# Patient Record
Sex: FEMALE | Race: WHITE | Employment: STUDENT | URBAN - METROPOLITAN AREA
[De-identification: names, ages, dates, MRNs, and addresses within clinical notes are randomized per-mention and may not be internally consistent; named-entity substitution may affect disease eponyms.]

---
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
## 2023-03-01 ENCOUNTER — OFFICE VISIT (OUTPATIENT)
Dept: OBGYN CLINIC | Facility: CLINIC | Age: 18
End: 2023-03-01
Payer: COMMERCIAL

## 2023-03-01 VITALS
BODY MASS INDEX: 27.58 KG/M2 | DIASTOLIC BLOOD PRESSURE: 58 MMHG | WEIGHT: 140.5 LBS | SYSTOLIC BLOOD PRESSURE: 108 MMHG | HEIGHT: 60 IN

## 2023-03-01 DIAGNOSIS — Z30.41 ENCOUNTER FOR SURVEILLANCE OF CONTRACEPTIVE PILLS: Primary | ICD-10-CM

## 2023-03-01 PROBLEM — Z30.09 CONTRACEPTIVE EDUCATION: Status: ACTIVE | Noted: 2023-03-01

## 2023-03-01 PROBLEM — Z30.011 ENCOUNTER FOR INITIAL PRESCRIPTION OF CONTRACEPTIVE PILLS: Status: ACTIVE | Noted: 2023-03-01

## 2023-03-01 PROCEDURE — 3078F DIAST BP <80 MM HG: CPT | Performed by: OBSTETRICS & GYNECOLOGY

## 2023-03-01 PROCEDURE — 99213 OFFICE O/P EST LOW 20 MIN: CPT | Performed by: OBSTETRICS & GYNECOLOGY

## 2023-03-01 PROCEDURE — 3008F BODY MASS INDEX DOCD: CPT | Performed by: OBSTETRICS & GYNECOLOGY

## 2023-03-01 PROCEDURE — 3074F SYST BP LT 130 MM HG: CPT | Performed by: OBSTETRICS & GYNECOLOGY

## 2023-03-02 DIAGNOSIS — N94.6 DYSMENORRHEA: ICD-10-CM

## 2023-03-02 DIAGNOSIS — N92.6 IRREGULAR MENSTRUAL CYCLE: Primary | ICD-10-CM

## 2023-03-02 RX ORDER — NORETHINDRONE ACETATE AND ETHINYL ESTRADIOL 1MG-20(21)
1 KIT ORAL DAILY
Qty: 84 TABLET | Refills: 3 | Status: SHIPPED | OUTPATIENT
Start: 2023-03-02 | End: 2024-03-01

## 2023-03-02 RX ORDER — NORETHINDRONE ACETATE AND ETHINYL ESTRADIOL 1MG-20(21)
1 KIT ORAL DAILY
Qty: 28 TABLET | Refills: 12 | Status: SHIPPED | OUTPATIENT
Start: 2023-03-02 | End: 2023-03-02

## 2023-06-19 ENCOUNTER — TELEPHONE (OUTPATIENT)
Dept: OBGYN CLINIC | Facility: CLINIC | Age: 18
End: 2023-06-19

## 2023-06-19 DIAGNOSIS — N92.6 IRREGULAR MENSTRUAL CYCLE: ICD-10-CM

## 2023-06-19 DIAGNOSIS — N94.6 DYSMENORRHEA: ICD-10-CM

## 2023-06-19 RX ORDER — NORETHINDRONE ACETATE AND ETHINYL ESTRADIOL 1MG-20(21)
1 KIT ORAL DAILY
Qty: 84 TABLET | Refills: 2 | Status: SHIPPED | OUTPATIENT
Start: 2023-06-19 | End: 2024-02-26

## 2023-06-19 NOTE — TELEPHONE ENCOUNTER
Mom called in requesting a new prescription be sent over to pt's new pharmacy.       BLISOVI FE 1/20 1-20 MG-MCG Oral Tab      [facility] [address] [place] [address], [phone], [phone]

## 2023-09-19 ENCOUNTER — LAB ENCOUNTER (OUTPATIENT)
Dept: LAB | Age: 18
End: 2023-09-19
Attending: INTERNAL MEDICINE
Payer: COMMERCIAL

## 2023-09-19 ENCOUNTER — OFFICE VISIT (OUTPATIENT)
Dept: ALLERGY | Facility: CLINIC | Age: 18
End: 2023-09-19

## 2023-09-19 ENCOUNTER — NURSE ONLY (OUTPATIENT)
Dept: ALLERGY | Facility: CLINIC | Age: 18
End: 2023-09-19

## 2023-09-19 VITALS
BODY MASS INDEX: 27.88 KG/M2 | OXYGEN SATURATION: 99 % | RESPIRATION RATE: 20 BRPM | SYSTOLIC BLOOD PRESSURE: 131 MMHG | TEMPERATURE: 99 F | HEART RATE: 72 BPM | HEIGHT: 60 IN | WEIGHT: 142 LBS | DIASTOLIC BLOOD PRESSURE: 87 MMHG

## 2023-09-19 DIAGNOSIS — L50.1 CHRONIC IDIOPATHIC URTICARIA: Primary | ICD-10-CM

## 2023-09-19 DIAGNOSIS — R79.89 ELEVATED TSH: ICD-10-CM

## 2023-09-19 DIAGNOSIS — Z28.21 COVID-19 VACCINATION DECLINED: ICD-10-CM

## 2023-09-19 DIAGNOSIS — Z91.09 ENVIRONMENTAL ALLERGIES: ICD-10-CM

## 2023-09-19 DIAGNOSIS — L50.8 CHRONIC URTICARIA: Primary | ICD-10-CM

## 2023-09-19 DIAGNOSIS — Z23 FLU VACCINE NEED: ICD-10-CM

## 2023-09-19 LAB — TSI SER-ACNC: 3.59 MIU/ML (ref 0.36–3.74)

## 2023-09-19 PROCEDURE — 3008F BODY MASS INDEX DOCD: CPT | Performed by: ALLERGY & IMMUNOLOGY

## 2023-09-19 PROCEDURE — 3075F SYST BP GE 130 - 139MM HG: CPT | Performed by: ALLERGY & IMMUNOLOGY

## 2023-09-19 PROCEDURE — 95004 PERQ TESTS W/ALRGNC XTRCS: CPT | Performed by: ALLERGY & IMMUNOLOGY

## 2023-09-19 PROCEDURE — 99204 OFFICE O/P NEW MOD 45 MIN: CPT | Performed by: ALLERGY & IMMUNOLOGY

## 2023-09-19 PROCEDURE — 3079F DIAST BP 80-89 MM HG: CPT | Performed by: ALLERGY & IMMUNOLOGY

## 2023-09-19 PROCEDURE — 84443 ASSAY THYROID STIM HORMONE: CPT

## 2023-09-19 PROCEDURE — 36415 COLL VENOUS BLD VENIPUNCTURE: CPT

## 2023-09-19 RX ORDER — LEVOCETIRIZINE DIHYDROCHLORIDE 5 MG/1
5 TABLET, FILM COATED ORAL 2 TIMES DAILY
Qty: 180 TABLET | Refills: 1 | Status: SHIPPED | OUTPATIENT
Start: 2023-09-19

## 2023-09-19 NOTE — PATIENT INSTRUCTIONS
#1Chronic idiopathic urticaria   2+ year history. Started shortly after COVID infection. Denies bruising scarring after rash resolves. No joint pain or joint swelling or fevers. Has tried Benadryl as needed. Tried Zyrtec once before in the past.  No prior Xyzal or Xolair. Prior lip swelling as well suggesting angioedema component    Handouts on chronic urticaria and angioedema provided and reviewed including majority being idiopathic in nature  Reviewed symptomatic care with antihistamines  Recommend a trial of Xyzal, levocetirizine 5 mg once a day up to 4 times per day if needed  Consider Xolair if refractory antihistamines including Xyzal.  Check TSH as previous TSH in May 2021 was slightly elevated. #2 environmental allergies  See above skin testing to common environmental allergens to screen for allergic triggers  Reviewed avoidance measures and potential treatment option of immunotherapy  Xyzal 5 mg once a day as an antihistamine  Flonase or Nasacort 2 sprays per nostril once a day if having prominent nasal congestion or postnasal drip      #3 elevated TSH. Prior TSH in May 2021 was slightly elevated. See above results. Repeat TSH to further evaluate. Reviewed potential association of thyroid disease with times    #4 COVID vaccines advised.   Patient deferred no prior COVID vaccines    #5 recommend flu vaccine this fall

## 2023-09-19 NOTE — PROGRESS NOTES
Star Fenton is a 19year old female. HPI:   Patient presents with:  Hives: Consult. Patient presents with concern of frequent hives with globus sensation in throat. Patient is a 78-year-old female who presents for allergy evaluation with a chief complaint of hives    Today patient reports    Rash:   Duration: since 2021  Started shortly after After covid infection  Symptoms:  hive like rash , lip swelling   Location: all over   Freq: every 2 days   Frequency:  intermittent   Severity: moderate  Denies resp issues   Status:   worse over past year   Tried:  benadryl  prn , zyrtec 1x,   Triggers:  ?? No bruising or scarring after rash resolves   No ed or pred  No epipen   No a/w foods       Preceding fever, infection, bite, sting, antibiotics, NSAID or new medication:    denies   History of physical triggers:   heat     No COVID vaccines noted in EMR    Mom is a PSR at [employer] of asthma, ad, food: denies   Ar? Rn, chetan, benadryl prn   Tsh 5/2021: tsh 4.22            HISTORY:  Past Medical History:   Diagnosis Date    Autism     Ovarian cyst       History reviewed. No pertinent surgical history.    Family History   Problem Relation Age of Onset    Hypertension Mother     Heart Disorder Mother 43         MI - s/p 3 stents     Lipids Mother     Lipids Other         HYPERLIPIDEMIA, FAMILY H/O    Heart Disease Other         CORONARY ARTERY DISEASE, FAMILY H/O    Diabetes Maternal Grandfather     Heart Disorder Maternal Grandfather     Hypertension Maternal Grandfather     Asthma Neg     Cancer Neg       Social History:   Social History     Socioeconomic History    Marital status: Single   Occupational History    Occupation: Student   Tobacco Use    Smoking status: Never     Passive exposure: Past    Smokeless tobacco: Never    Tobacco comments:     Father smokes outside   Vaping Use    Vaping Use: Never used   Substance and Sexual Activity    Alcohol use: Never     Alcohol/week: 0.0 standard drinks of alcohol    Drug use: Never    Sexual activity: Never   Other Topics Concern    Second-hand smoke exposure No    Alcohol/drug concerns No    Violence concerns No    Blood Transfusions No   Social History Narrative    Live with patients, aunt, sibling and dogs    No h/o abuse        Medications (Active prior to today's visit):  Current Outpatient Medications   Medication Sig Dispense Refill    levocetirizine 5 MG Oral Tab Take 1 tablet (5 mg total) by mouth in the morning and 1 tablet (5 mg total) before bedtime. 180 tablet 1    BLISOVI FE 1/20 1-20 MG-MCG Oral Tab Take 1 tablet by mouth daily. 84 tablet 2    Norethin Ace-Eth Estrad-FE (BLISOVI FE 1/20) 1-20 MG-MCG Oral Tab Take 1 tablet by mouth daily.  84 tablet 3       Allergies:    Junel 1-20 [Necon]      PALPITATIONS    Comment:Heart racing, mood changes  Z-Good [Azithromycin]    REACTIVE AIRWAY DISEASE      ROS:     Allergic/Immuno:  See HPI  Cardiovascular:  Negative for irregular heartbeat/palpitations, chest pain, edema  Constitutional:  Negative night sweats,weight loss, irritability and lethargy  Endocrine:  Negative for cold intolerance, polydipsia and polyphagia  ENMT:  Negative for ear drainage, hearing loss see hpi   Eyes:  Negative for eye discharge and vision loss  Gastrointestinal:  Negative for abdominal pain, diarrhea and vomiting  Genitourinary:  Negative for dysuria and hematuria  Hema/Lymph:  Negative for easy bleeding and easy bruising  Integumentary:   see hpi   Musculoskeletal:  Negative for joint symptoms  Neurological:  Negative for dizziness, seizures  Psychiatric:  Negative for inappropriate interaction and psychiatric symptoms  Respiratory:  Negative for cough, dyspnea and wheezing      PHYSICAL EXAM:   Constitutional: responsive, no acute distress noted  Head/Face: NC/Atraumatic  Eyes/Vision: conjunctiva and lids are normal extraocular motion is intact   Ears/Audiometry: tympanic membranes are normal bilaterally hearing is grossly intact  Nose/Mouth/Throat: nose and throat are clear mucous membranes are moist   Neck/Thyroid: neck is supple without adenopathy  Lymphatic: no abnormal cervical, supraclavicular or axillary adenopathy is noted  Respiratory: normal to inspection lungs are clear to auscultation bilaterally normal respiratory effort   Cardiovascular: regular rate and rhythm no murmurs, gallups, or rubs  Abdomen: soft non-tender non-distended  Skin/Hair: no unusual rashes present  Extremities: no edema, cyanosis, or clubbing  Neurological:Oriented to time, place, person & situation       ASSESSMENT/PLAN:   Assessment   Chronic idiopathic urticaria  (primary encounter diagnosis)  Covid-19 vaccination declined  Flu vaccine need  Elevated tsh  Environmental allergies    Skin testing today to common indoor and outdoor environmental allergies was  Negative on scratch testing  Positive histamine control  Patient deferred intradermal testing    #1 Chronic idiopathic urticaria   2+ year history. Started shortly after COVID infection. Denies bruising scarring after rash resolves. No joint pain or joint swelling or fevers. Has tried Benadryl as needed. Tried Zyrtec once before in the past.  No prior Xyzal or Xolair. Prior lip swelling as well suggesting angioedema component    Handouts on chronic urticaria and angioedema provided and reviewed including majority being idiopathic in nature  Reviewed symptomatic care with antihistamines  Recommend a trial of Xyzal, levocetirizine 5 mg once a day up to 4 times per day if needed  Consider Xolair if refractory antihistamines including Xyzal.  Check TSH as previous TSH in May 2021 was slightly elevated.     #2 environmental allergies  See above skin testing to common environmental allergens to screen for allergic triggers  Reviewed avoidance measures and potential treatment option of immunotherapy  Xyzal 5 mg once a day as an antihistamine  Flonase or Nasacort 2 sprays per nostril once a day if having prominent nasal congestion or postnasal drip      #3 elevated TSH. Prior TSH in May 2021 was slightly elevated. See above results. Repeat TSH to further evaluate. Reviewed potential association of thyroid disease with times    #4 COVID vaccines advised. Patient deferred no prior COVID vaccines    #5 recommend flu vaccine this fall         Orders This Visit:  Orders Placed This Encounter      TSH W Reflex To Free T4      Meds This Visit:  Requested Prescriptions     Signed Prescriptions Disp Refills    levocetirizine 5 MG Oral Tab 180 tablet 1     Sig: Take 1 tablet (5 mg total) by mouth in the morning and 1 tablet (5 mg total) before bedtime. Imaging & Referrals:  None     9/19/2023  Zechariah Claudio MD      If medication samples were provided today, they were provided solely for patient education and training related to self administration of these medications. Teaching, instruction and sample was provided to the patient by myself. Teaching included  a review of potential adverse side effects as well as potential efficacy. Patient's questions were answered in regards to medication administration and dosing and potential side effects.  Teaching was provided via the teach back method

## 2023-09-20 ENCOUNTER — TELEPHONE (OUTPATIENT)
Dept: ALLERGY | Facility: CLINIC | Age: 18
End: 2023-09-20

## 2023-09-20 NOTE — TELEPHONE ENCOUNTER
Pt returned call. She confirmed her name and . RN went over results. Pt verbalized understanding and denies any further questions or concerns.

## 2023-09-20 NOTE — TELEPHONE ENCOUNTER
RN called pt to go over results. Mother answered phone, she reports that she is not with pt. RN requested that pt contact our office to go over results. Mother verbalizes understanding and reports that she will notify pt. Provided call back number.      ----- Message from Darío Veloz MD sent at 9/20/2023  7:35 AM CDT -----    Please contact patient with normal TSH level 3.59

## 2024-01-19 ENCOUNTER — LAB ENCOUNTER (OUTPATIENT)
Dept: LAB | Facility: REFERENCE LAB | Age: 19
End: 2024-01-19
Attending: OBSTETRICS & GYNECOLOGY
Payer: COMMERCIAL

## 2024-01-19 ENCOUNTER — OFFICE VISIT (OUTPATIENT)
Dept: OBGYN CLINIC | Facility: CLINIC | Age: 19
End: 2024-01-19
Payer: COMMERCIAL

## 2024-01-19 VITALS
WEIGHT: 150 LBS | BODY MASS INDEX: 29.45 KG/M2 | DIASTOLIC BLOOD PRESSURE: 62 MMHG | HEIGHT: 60 IN | SYSTOLIC BLOOD PRESSURE: 126 MMHG

## 2024-01-19 DIAGNOSIS — N92.6 IRREGULAR MENSTRUAL CYCLE: ICD-10-CM

## 2024-01-19 DIAGNOSIS — N92.6 IRREGULAR MENSTRUAL CYCLE: Primary | ICD-10-CM

## 2024-01-19 LAB
CONTROL LINE PRESENT WITH A CLEAR BACKGROUND (YES/NO): YES YES/NO
FSH SERPL-ACNC: 6.5 MIU/ML
KIT EXPIRATION DATE: NORMAL DATE
KIT LOT #: NORMAL NUMERIC
LH SERPL-ACNC: 28.8 MIU/ML
PREGNANCY TEST, URINE: NEGATIVE
PROLACTIN SERPL-MCNC: 12.5 NG/ML
T4 FREE SERPL-MCNC: 1 NG/DL (ref 0.8–1.7)
TSI SER-ACNC: 7.38 MIU/ML (ref 0.55–4.78)

## 2024-01-19 PROCEDURE — 84146 ASSAY OF PROLACTIN: CPT | Performed by: OBSTETRICS & GYNECOLOGY

## 2024-01-19 PROCEDURE — 83002 ASSAY OF GONADOTROPIN (LH): CPT

## 2024-01-19 PROCEDURE — 3074F SYST BP LT 130 MM HG: CPT | Performed by: OBSTETRICS & GYNECOLOGY

## 2024-01-19 PROCEDURE — 87491 CHLMYD TRACH DNA AMP PROBE: CPT | Performed by: OBSTETRICS & GYNECOLOGY

## 2024-01-19 PROCEDURE — 83001 ASSAY OF GONADOTROPIN (FSH): CPT

## 2024-01-19 PROCEDURE — 36415 COLL VENOUS BLD VENIPUNCTURE: CPT

## 2024-01-19 PROCEDURE — 3008F BODY MASS INDEX DOCD: CPT | Performed by: OBSTETRICS & GYNECOLOGY

## 2024-01-19 PROCEDURE — 84443 ASSAY THYROID STIM HORMONE: CPT

## 2024-01-19 PROCEDURE — 3078F DIAST BP <80 MM HG: CPT | Performed by: OBSTETRICS & GYNECOLOGY

## 2024-01-19 PROCEDURE — 84439 ASSAY OF FREE THYROXINE: CPT

## 2024-01-19 PROCEDURE — 99213 OFFICE O/P EST LOW 20 MIN: CPT | Performed by: OBSTETRICS & GYNECOLOGY

## 2024-01-19 PROCEDURE — 87591 N.GONORRHOEAE DNA AMP PROB: CPT | Performed by: OBSTETRICS & GYNECOLOGY

## 2024-01-19 PROCEDURE — 81025 URINE PREGNANCY TEST: CPT | Performed by: OBSTETRICS & GYNECOLOGY

## 2024-01-19 NOTE — PROGRESS NOTES
GYN H&P     2024  9:36 AM    CC: Patient is here for missed period. UCG negative here today. Patient does not want to be pregnant.     HPI: Patient is a 18 year old  for missed period. Her period is 10 days late. Home UCG was negative.     She is sexually active, using condoms. She previous used OCP's and \"heart rate went too high\" and had to stop OCP's.     Menses: usually 1 x  per month, heavy, painful   Pelvic Pain: None  Vaginal discharge:None    Review of Systems:    Pertinent positive and negatives reviewed in HPI    CONSTITUTIONAL:  No weight loss, no fever, chills, no weakness, no fatigue. + 10 lbs wt gain in the past week,   SKIN:  No rash, no  itching. No new facial or body hair, new new acne  GASTROINTESTINAL:  No anorexia, + nausea, no vomiting, no diarrhea, no abdominal pain, no blood in stool or blackened stools, no constipation, no bloating  GENITOURINARY:  No burning on urination. No urinary frequency.  No blood in urine. No urinary incontinence  MUSCULOSKELETAL:  No muscle pain, no back pain, no joint pain, no stiffness.  HEMATOLOGIC:  No history of anemia, no excessive bleeding, no excessive bruising.  LYMPHATICS:  No enlarged nodes. No history of splenectomy.  PSYCHIATRIC:  No history of depression, no history of anxiety.  ENDOCRINOLOGIC:  No reports of sweating, + cold intolerance, no heat intolerance, no excessive urination, no excessive thirst  ALLERGIES:  No history of asthma, no hives, no eczema, no rhinitis.      Depression Screening (PHQ-2/PHQ-9): Over the LAST 2 WEEKS   Little interest or pleasure in doing things (over the last two weeks)?: Not at all    Feeling down, depressed, or hopeless (over the last two weeks)?: Not at all    PHQ-2 SCORE: 0          Patient's last menstrual period was 2023 (exact date).    OB History    Para Term  AB Living   0 0 0 0 0 0   SAB IAB Ectopic Multiple Live Births   0 0 0 0 0       GYN hx:         STI HX: none  LPS:  never  Hx of abnormal paps no    CONTRACEPTION: condoms  SEXUALLY ACTIVE: yes  CONDOM USE: yes  HPV VACCINE no  LAST MAMMOGRAM: no      Past Medical History:   Diagnosis Date    Autism     Environmental allergies     Ovarian cyst      History reviewed. No pertinent surgical history.  Allergies   Allergen Reactions    Junel 1-20 [Necon] PALPITATIONS     Heart racing, mood changes      Z-Good [Azithromycin] REACTIVE AIRWAY DISEASE     Family History   Problem Relation Age of Onset    Hypertension Mother     Heart Disorder Mother 42         MI - s/p 3 stents     Lipids Mother     Diabetes Maternal Grandfather     Heart Disorder Maternal Grandfather     Hypertension Maternal Grandfather     Lipids Other         HYPERLIPIDEMIA, FAMILY H/O    Heart Disease Other         CORONARY ARTERY DISEASE, FAMILY H/O    Asthma Neg     Cancer Neg      Social History     Socioeconomic History    Marital status: Single   Occupational History    Occupation: Student     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   Tobacco Use    Smoking status: Never     Passive exposure: Past    Smokeless tobacco: Never    Tobacco comments:     Father smokes outside   Vaping Use    Vaping Use: Never used   Substance and Sexual Activity    Alcohol use: Never     Alcohol/week: 0.0 standard drinks of alcohol    Drug use: Never    Sexual activity: Yes     Partners: Male     Birth control/protection: Condom     Social History     Social History Narrative    Live with patients, aunt, sibling, grandmother and dogs    No h/o abuse       Medications reviewed. See active list.     /62   Ht 60\"   Wt 150 lb (68 kg)   LMP 12/08/2023 (Exact Date)   BMI 29.29 kg/m²       Exam:   GENERAL: well developed, well nourished, in no apparent distress        A/P: Patient is 18 year old female     1. Irregular menstrual cycle  - Urine Preg Test [15449]  - LH (Luteinizing Hormone); Future  - FSH; Future  - TSH W Reflex To Free T4; Future  - Prolactin    Check labs.   I dw her  birth control options and written info given re: Mirena IUD. Patient is considering.     Rahel Perez MD

## 2024-01-20 PROBLEM — R79.89 ELEVATED TSH: Status: ACTIVE | Noted: 2024-01-20

## 2024-01-22 LAB
C TRACH DNA SPEC QL NAA+PROBE: NEGATIVE
N GONORRHOEA DNA SPEC QL NAA+PROBE: NEGATIVE

## 2024-01-24 ENCOUNTER — OFFICE VISIT (OUTPATIENT)
Dept: INTERNAL MEDICINE CLINIC | Facility: CLINIC | Age: 19
End: 2024-01-24
Payer: COMMERCIAL

## 2024-01-24 ENCOUNTER — LAB ENCOUNTER (OUTPATIENT)
Dept: LAB | Age: 19
End: 2024-01-24
Payer: COMMERCIAL

## 2024-01-24 VITALS
SYSTOLIC BLOOD PRESSURE: 118 MMHG | HEART RATE: 79 BPM | HEIGHT: 61 IN | BODY MASS INDEX: 28.7 KG/M2 | WEIGHT: 152 LBS | DIASTOLIC BLOOD PRESSURE: 76 MMHG

## 2024-01-24 DIAGNOSIS — E03.8 SUBCLINICAL HYPOTHYROIDISM: ICD-10-CM

## 2024-01-24 DIAGNOSIS — E03.8 SUBCLINICAL HYPOTHYROIDISM: Primary | ICD-10-CM

## 2024-01-24 LAB
T4 FREE SERPL-MCNC: 0.9 NG/DL (ref 0.8–1.7)
THYROPEROXIDASE AB SERPL-ACNC: <28 U/ML (ref ?–60)
TSI SER-ACNC: 5.33 MIU/ML (ref 0.55–4.78)

## 2024-01-24 PROCEDURE — 84443 ASSAY THYROID STIM HORMONE: CPT

## 2024-01-24 PROCEDURE — 86376 MICROSOMAL ANTIBODY EACH: CPT

## 2024-01-24 PROCEDURE — 84439 ASSAY OF FREE THYROXINE: CPT

## 2024-01-24 PROCEDURE — 3008F BODY MASS INDEX DOCD: CPT

## 2024-01-24 PROCEDURE — 3074F SYST BP LT 130 MM HG: CPT

## 2024-01-24 PROCEDURE — 36415 COLL VENOUS BLD VENIPUNCTURE: CPT

## 2024-01-24 PROCEDURE — 3078F DIAST BP <80 MM HG: CPT

## 2024-01-24 PROCEDURE — 99213 OFFICE O/P EST LOW 20 MIN: CPT

## 2024-01-24 NOTE — PROGRESS NOTES
Subjective:   Karen Oglesby is a 18 year old female who presents for Follow - Up (Discuss lab results - elevated thyroid )     Was at her OBGYN recently for a missed period, is now 16 days late and has still not gotten her period  She was also experiencing nausea with certain foods, mood swings, excessive tiredness, and cold intolerance, and also gained 10lbs in about a week, so testing was done including thyroid testing which found a mildly elevated TSH of 7.382 and free T4 of 1.0  Pregnancy test was negative at OB office, LH, FSH and prolactin were normal   Usually has regular periods monthly but are very heavy and painful. States her OB had told her she was in the early stages of endometriosis. She also has a sister with PCOS. She has had multiple pelvic US with no evidence of PCOS. She does note excess hair growth in the axilla, abdomen or on the knees but not on the face.  When she had COVID in 2021 she had irregular periods and would go 4-6 months without period, but periods have since normalized.   Denies recent illness but notes she has been under more stress recently due to school and deaths in the family   Denies recent excessive exercise     Reports history of anxiety related to autism, states she is always anxious, has tried therapy in the past without success, has never tried medication  Sometimes feels her heart racing with anxiety, also occurs when she takes medication such as tylenol and when she took OCPs   Last night felt the heart racing and improved when she took deep breaths and relaxed  No dizziness or chest pain with these episodes    History/Other:    Chief Complaint Reviewed and Verified  Nursing Notes Reviewed and   Verified  Tobacco Reviewed  Allergies Reviewed  Medications Reviewed    Problem List Reviewed  Medical History Reviewed  Surgical History   Reviewed  OB Status Reviewed  Family History Reviewed         Tobacco:  She has never smoked tobacco.    No current  outpatient medications on file.     Review of Systems:  Review of Systems   Constitutional:  Positive for fatigue and unexpected weight change.   Respiratory: Negative.     Cardiovascular:  Positive for palpitations. Negative for chest pain.   Gastrointestinal:  Positive for nausea.   Endocrine: Positive for cold intolerance.   Genitourinary:  Positive for menstrual problem.   Skin: Negative.    Neurological: Negative.  Negative for dizziness.   Psychiatric/Behavioral:  The patient is nervous/anxious.      Objective:   /76 (BP Location: Right arm, Patient Position: Sitting, Cuff Size: adult)   Pulse 79   Ht 5' 1\" (1.549 m)   Wt 152 lb (68.9 kg)   LMP 12/08/2023 (Exact Date)   BMI 28.72 kg/m²  Estimated body mass index is 28.72 kg/m² as calculated from the following:    Height as of this encounter: 5' 1\" (1.549 m).    Weight as of this encounter: 152 lb (68.9 kg).  Physical Exam  Vitals reviewed.   Constitutional:       General: She is not in acute distress.     Appearance: Normal appearance. She is well-developed.   Neck:      Thyroid: No thyroid mass or thyromegaly.   Cardiovascular:      Rate and Rhythm: Normal rate and regular rhythm.      Heart sounds: Normal heart sounds.   Pulmonary:      Effort: Pulmonary effort is normal.      Breath sounds: Normal breath sounds.   Lymphadenopathy:      Cervical: No cervical adenopathy.   Skin:     General: Skin is warm and dry.   Neurological:      Mental Status: She is alert and oriented to person, place, and time.       Assessment & Plan:   1. Subclinical hypothyroidism (Primary)  -     Thyroid Peroxidase (TPO) AB; Future; Expected date: 01/24/2024  -     TSH and Free T4; Future; Expected date: 02/24/2024  Discussed not currently in treatment range as TSH mildly elevated and Free T4 normal. Will check TPO AB and recheck thyroid function in about a month  Continue to monitor menstrual cycles - discussed other possible causes of abnormality    JUAN JOSE Carpenter,  1/24/2024, 10:29 AM

## 2024-05-25 ENCOUNTER — HOSPITAL ENCOUNTER (OUTPATIENT)
Age: 19
Discharge: HOME | End: 2024-05-25

## 2024-05-25 ENCOUNTER — APPOINTMENT (OUTPATIENT)
Dept: GENERAL RADIOLOGY | Age: 19
End: 2024-05-25
Attending: NURSE PRACTITIONER

## 2024-05-25 VITALS
DIASTOLIC BLOOD PRESSURE: 83 MMHG | TEMPERATURE: 99 F | SYSTOLIC BLOOD PRESSURE: 134 MMHG | RESPIRATION RATE: 20 BRPM | HEART RATE: 68 BPM | OXYGEN SATURATION: 100 %

## 2024-05-25 DIAGNOSIS — S99.912A LEFT ANKLE INJURY, INITIAL ENCOUNTER: ICD-10-CM

## 2024-05-25 DIAGNOSIS — S99.922A FOOT INJURY, LEFT, INITIAL ENCOUNTER: Primary | ICD-10-CM

## 2024-05-25 PROCEDURE — 99213 OFFICE O/P EST LOW 20 MIN: CPT | Performed by: NURSE PRACTITIONER

## 2024-05-25 PROCEDURE — A6448 LT COMPRES BAND <3"/YD: HCPCS | Performed by: NURSE PRACTITIONER

## 2024-05-25 PROCEDURE — 73610 X-RAY EXAM OF ANKLE: CPT | Performed by: NURSE PRACTITIONER

## 2024-05-25 PROCEDURE — 73630 X-RAY EXAM OF FOOT: CPT | Performed by: NURSE PRACTITIONER

## 2024-05-25 NOTE — ED PROVIDER NOTES
Patient Seen in: [facility]      History     Chief Complaint   Patient presents with    Leg or Foot Injury     Stated Complaint: Right ankle injury    Subjective:   18 y/o female with medical conditions as noted below presents with c/o right ankle and foot pain and swelling onset yesterday after twisting her foot when she stepped over a box. Iced area and took tylenol. Has been walking with a limp.             Objective:   Past Medical History:    Autism (HCC)    Environmental allergies    Ovarian cyst              History reviewed. No pertinent surgical history.             Social History     Socioeconomic History    Marital status: Single   Occupational History    Occupation: Student     Comment: studying education @ [employer].   Tobacco Use    Smoking status: Never     Passive exposure: Past    Smokeless tobacco: Never    Tobacco comments:     Father smokes outside   Vaping Use    Vaping status: Never Used   Substance and Sexual Activity    Alcohol use: Never     Alcohol/week: 0.0 standard drinks of alcohol    Drug use: Never    Sexual activity: Yes     Partners: Male     Birth control/protection: Condom   Other Topics Concern    Second-hand smoke exposure No    Alcohol/drug concerns No    Violence concerns No    Blood Transfusions No   Social History Narrative    Live with patients, aunt, sibling, grandmother and dogs    No h/o abuse              Review of Systems   Musculoskeletal:  Positive for joint swelling.   Neurological:  Negative for numbness.   All other systems reviewed and are negative.      Positive for stated complaint: Right ankle injury  Other systems are as noted in HPI.  Constitutional and vital signs reviewed.      All other systems reviewed and negative except as noted above.    Physical Exam     ED Triage Vitals [05/25/24 1506]   /83   Pulse 68   Resp 20   Temp 98.6 °F (37 °C)   Temp src Temporal   SpO2 100 %   O2 Device None (Room air)       Current Vitals:   Vital  Signs  BP: 134/83  Pulse: 68  Resp: 20  Temp: 98.6 °F (37 °C)  Temp src: Temporal    Oxygen Therapy  SpO2: 100 %  O2 Device: None (Room air)            Physical Exam  Vitals and nursing note reviewed.   Constitutional:       General: She is not in acute distress.     Appearance: Normal appearance.   Cardiovascular:      Rate and Rhythm: Normal rate and regular rhythm.   Pulmonary:      Effort: Pulmonary effort is normal.      Breath sounds: Normal breath sounds.   Musculoskeletal:         General: Swelling and tenderness present.      Right ankle: No swelling. Tenderness present over the lateral malleolus and medial malleolus. Normal range of motion.      Right Achilles Tendon: Normal.      Right foot: Swelling and tenderness present. No deformity.      Comments: Right ankle/foot: no swelling to ankle. Swelling to top of foot. Tenderness on palpation bilateral malleolus and anterior dorsal surface of foot.  No deformity, ecchymosis. Valenzuela's test negative. 2+pedal/postibial pulses. FROM to ankle and toes.      Skin:     General: Skin is warm and dry.      Capillary Refill: Capillary refill takes less than 2 seconds.   Neurological:      Mental Status: She is alert and oriented to person, place, and time.   Psychiatric:         Behavior: Behavior is cooperative.               ED Course   Labs Reviewed - No data to display  XR FOOT, COMPLETE (MIN 3 VIEWS), RIGHT (CPT=73630)    Result Date: 5/25/2024  CONCLUSION:   No radiographically visible acute osseous injury of the right foot.    elm-remote  Dictated by (CST): Perez Gallagher MD on 5/25/2024 at 3:22 PM     Finalized by (CST): Perez Gallagher MD on 5/25/2024 at 3:23 PM          XR ANKLE (MIN 3 VIEWS), RIGHT (CPT=73610)    Result Date: 5/25/2024  CONCLUSION:   No radiographically visible acute osseous injury of the right ankle.    elm-remote  Dictated by (CST): Perez Gallagher MD on 5/25/2024 at 3:22 PM     Finalized by (CST): Perez Gallagher MD on 5/25/2024 at  3:22 PM                          MDM                                         Medical Decision Making  Patient is well-appearing. In NAD  I discussed differentials with patient including but not limited to sprain vs fracture  Xray independently reviewed and discussed with patient, negative fractures  Ace wrap applied by RN. Declined crutches  otc meds prn  Fu with PCP/Ortho. Return/ ED precautions discussed      Problems Addressed:  Foot injury, left, initial encounter: acute illness or injury  Left ankle injury, initial encounter: acute illness or injury    Amount and/or Complexity of Data Reviewed  Radiology: ordered. Decision-making details documented in ED Course.    Risk  OTC drugs.        Disposition and Plan     Clinical Impression:  1. Foot injury, left, initial encounter    2. Left ankle injury, initial encounter         Disposition:  Discharge  5/25/2024  3:31 pm    Follow-up:  Cheryl Schaefer MD  [address] [facility] [id number] [phone]          Atiya Becker DPM  [address] [facility] [address]  [facility] [id number] [phone]                Medications Prescribed:  There are no discharge medications for this patient.

## 2024-05-25 NOTE — ED INITIAL ASSESSMENT (HPI)
Pt here with complaints of right foot injury, pt states she tripped over a box at work , pt right foot swollen and states she has discomfort when ambulating

## 2024-07-12 ENCOUNTER — TELEPHONE (OUTPATIENT)
Dept: OBGYN CLINIC | Facility: CLINIC | Age: 19
End: 2024-07-12

## 2024-07-12 NOTE — TELEPHONE ENCOUNTER
Pts LMP of 5/1/2024 making her 10w2d. Patient cycles are irregular states only getting them every other month. 3/7/2024 was pregnancy States +UPT. Pt informed of both male and female providers and the need to rotate PN appt with all since OB on-call will be the one that delivers her. Pt accepts rotation and wishes to proceed with establishing care. Patient scheduled to establish care due to irregular cycles. Patient to see Karen Sanchez 7/17.     Stated HT: 5 ft 1 in  Stated Pre-pregnancy WT: 152 lb

## 2024-07-17 ENCOUNTER — LAB ENCOUNTER (OUTPATIENT)
Dept: LAB | Age: 19
End: 2024-07-17
Attending: NURSE PRACTITIONER
Payer: COMMERCIAL

## 2024-07-17 ENCOUNTER — TELEPHONE (OUTPATIENT)
Dept: OBGYN CLINIC | Facility: CLINIC | Age: 19
End: 2024-07-17

## 2024-07-17 ENCOUNTER — OFFICE VISIT (OUTPATIENT)
Dept: OBGYN CLINIC | Facility: CLINIC | Age: 19
End: 2024-07-17
Payer: COMMERCIAL

## 2024-07-17 VITALS
BODY MASS INDEX: 30 KG/M2 | DIASTOLIC BLOOD PRESSURE: 74 MMHG | SYSTOLIC BLOOD PRESSURE: 118 MMHG | HEART RATE: 71 BPM | WEIGHT: 159 LBS

## 2024-07-17 DIAGNOSIS — N92.6 MISSED MENSES: ICD-10-CM

## 2024-07-17 DIAGNOSIS — E03.8 SUBCLINICAL HYPOTHYROIDISM: ICD-10-CM

## 2024-07-17 DIAGNOSIS — Z32.02 PREGNANCY EXAMINATION OR TEST, NEGATIVE RESULT: Primary | ICD-10-CM

## 2024-07-17 LAB
B-HCG SERPL-ACNC: <2.6 MIU/ML
CONTROL LINE PRESENT WITH A CLEAR BACKGROUND (YES/NO): YES YES/NO
KIT LOT #: NORMAL NUMERIC
PREGNANCY TEST, URINE: NEGATIVE
T4 FREE SERPL-MCNC: 1 NG/DL (ref 0.8–1.7)
TSI SER-ACNC: 5.86 MIU/ML (ref 0.55–4.78)

## 2024-07-17 PROCEDURE — 36415 COLL VENOUS BLD VENIPUNCTURE: CPT

## 2024-07-17 PROCEDURE — 84702 CHORIONIC GONADOTROPIN TEST: CPT

## 2024-07-17 PROCEDURE — 84443 ASSAY THYROID STIM HORMONE: CPT

## 2024-07-17 PROCEDURE — 99212 OFFICE O/P EST SF 10 MIN: CPT | Performed by: NURSE PRACTITIONER

## 2024-07-17 PROCEDURE — 84439 ASSAY OF FREE THYROXINE: CPT

## 2024-07-17 PROCEDURE — 81025 URINE PREGNANCY TEST: CPT | Performed by: NURSE PRACTITIONER

## 2024-07-17 NOTE — TELEPHONE ENCOUNTER
Patient informed of results and recommendations to check home pregnancy test in one week of no period.

## 2024-07-17 NOTE — TELEPHONE ENCOUNTER
Please inform patient her HCG today was negative on urine and on lab test. She got a +home pregnancy test on 7/10& 7/12.  If not period by 1 week from now, repeat home pregnancy test.

## 2024-07-17 NOTE — PROGRESS NOTES
[facility]   Obstetrics and Gynecology    Karen Oglesby is a 19 year old female  Patient's last menstrual period was 2024 (exact date).   Chief Complaint   Patient presents with    Gyn Problem     POST UPT   New patient. Sure last menstrual period. Prior period to May had a period in March. Periods every other month.    Hx of allergy to birth control - stopped a year ago. Had a reaction to generic - she had tachycardia with generic     Positive home urine pregnancy test on 7/10- faint, then darker on .  Symptoms of pregnancy - queezy, tired, changes in smell. Some low pevic cramping. No bleeding.    She is taking a prenatal vitamin.  Today in office urine pregnancy test negative.      Elevated TSH in January but normal FT4.     Not trying for pregnancy, using condoms.    Pap:never  Contraception: none    OBSTETRICS HISTORY:  OB History    Para Term  AB Living   1 0 0 0 0 0   SAB IAB Ectopic Multiple Live Births   0 0 0 0 0       GYNE HISTORY:  Menarche: 8y.o. (2024  8:37 AM)  Use of Birth Control (if yes, specify type): None (2024  8:37 AM)  Hx Prior Abnormal Pap: No (2024  8:37 AM)  Pap Result Notes: Never had pap (2024  8:37 AM)      History   Sexual Activity    Sexual activity: Yes    Partners: Male    Birth control/ protection: Condom       MEDICAL HISTORY:  Past Medical History:    Autism (HCC)    Environmental allergies    Ovarian cyst       SOCIAL HISTORY:  Social History     Socioeconomic History    Marital status: Single     Spouse name: Not on file    Number of children: Not on file    Years of education: Not on file    Highest education level: Not on file   Occupational History    Occupation: Student     Comment: studying education @ [employer].   Tobacco Use    Smoking status: Never     Passive exposure: Past    Smokeless tobacco: Never    Tobacco comments:     Father smokes outside   Vaping Use    Vaping status: Never Used   Substance and Sexual  Activity    Alcohol use: Never     Alcohol/week: 0.0 standard drinks of alcohol    Drug use: Never    Sexual activity: Yes     Partners: Male     Birth control/protection: Condom   Other Topics Concern    Second-hand smoke exposure No    Alcohol/drug concerns No    Violence concerns No     Service Not Asked    Blood Transfusions No    Caffeine Concern Not Asked    Occupational Exposure Not Asked    Hobby Hazards Not Asked    Sleep Concern Not Asked    Stress Concern Not Asked    Weight Concern Not Asked    Special Diet Not Asked    Back Care Not Asked    Exercise Not Asked    Bike Helmet Not Asked    Seat Belt Not Asked    Self-Exams Not Asked   Social History Narrative    Live with patients, aunt, sibling, grandmother and dogs    No h/o abuse     Social Determinants of Health     Financial Resource Strain: Not on file   Food Insecurity: Not on file   Transportation Needs: Not on file   Stress: Not on file   Housing Stability: Not on file       MEDICATIONS:  No current outpatient medications on file.    ALLERGIES:    Allergies   Allergen Reactions    Junel 1-20 [Necon] PALPITATIONS     Heart racing, mood changes      Z-Good [Azithromycin] REACTIVE AIRWAY DISEASE         Review of Systems:  Constitutional:  Denies fatigue, night sweats, hot flashes  Cardiovascular:  denies chest pain or palpitations  Respiratory:  denies shortness of breath  Gastrointestinal:  denies heartburn, abdominal pain, diarrhea or constipation  Genitourinary:  denies dysuria, incontinence, abnormal vaginal discharge, vaginal itching   Musculoskeletal:  denies back pain.  Skin/Breast:  Denies any breast pain, lumps, or discharge.   Neurological:  denies headaches, extremity weakness or numbness.  Psychiatric: denies depression or anxiety.  Endocrine:   denies excessive thirst or urination.  Heme/Lymph:  denies history of anemia, easy bruising or bleeding.      PHYSICAL EXAM:     Vitals:    07/17/24 0837   BP: 118/74   Pulse: 71    Weight: 159 lb (72.1 kg)     Body mass index is 30.04 kg/m².       Constitutional: well developed, well nourished    Psychiatric:  Oriented to time, place, person and situation. Appropriate mood and affect    Assessment & Plan:  Karen was seen today for gyn problem.    Diagnoses and all orders for this visit:    Pregnancy examination or test, negative result  -     Urine Pregnancy Test    Missed menses  -     HCG, Beta Subunit (Quant Pregnancy Test); Future      Will do lab HCG quant today  Will follow up based on results.  Hx of irregular periods  Repeat TSH also. If HCG + and TSH still elevated would recommend treating.    JUAN JOSE Hemphill    This note was prepared using Dragon Medical voice recognition dictation software. As a result errors may occur. When identified these errors have been corrected. While every attempt is made to correct errors during dictation discrepancies may still exist.

## 2024-09-30 ENCOUNTER — APPOINTMENT (OUTPATIENT)
Dept: ULTRASOUND IMAGING | Facility: HOSPITAL | Age: 19
End: 2024-09-30
Attending: EMERGENCY MEDICINE
Payer: COMMERCIAL

## 2024-09-30 ENCOUNTER — HOSPITAL ENCOUNTER (EMERGENCY)
Facility: HOSPITAL | Age: 19
Discharge: HOME | End: 2024-09-30
Attending: EMERGENCY MEDICINE
Payer: COMMERCIAL

## 2024-09-30 VITALS
HEART RATE: 82 BPM | TEMPERATURE: 97 F | RESPIRATION RATE: 18 BRPM | DIASTOLIC BLOOD PRESSURE: 91 MMHG | SYSTOLIC BLOOD PRESSURE: 138 MMHG | OXYGEN SATURATION: 98 %

## 2024-09-30 DIAGNOSIS — N39.0 URINARY TRACT INFECTION WITHOUT HEMATURIA, SITE UNSPECIFIED: ICD-10-CM

## 2024-09-30 DIAGNOSIS — K80.50 BILIARY COLIC: Primary | ICD-10-CM

## 2024-09-30 DIAGNOSIS — R10.11 ABDOMINAL PAIN, RIGHT UPPER QUADRANT: ICD-10-CM

## 2024-09-30 LAB
ALBUMIN SERPL-MCNC: 4.9 G/DL (ref 3.2–4.8)
ALBUMIN/GLOB SERPL: 1.6 {RATIO} (ref 1–2)
ALP LIVER SERPL-CCNC: 100 U/L
ALT SERPL-CCNC: 8 U/L
ANION GAP SERPL CALC-SCNC: 8 MMOL/L (ref 0–18)
AST SERPL-CCNC: 18 U/L (ref ?–34)
B-HCG UR QL: NEGATIVE
BASOPHILS # BLD AUTO: 0.05 X10(3) UL (ref 0–0.2)
BASOPHILS NFR BLD AUTO: 0.9 %
BILIRUB SERPL-MCNC: 0.3 MG/DL (ref 0.3–1.2)
BILIRUB UR QL: NEGATIVE
BUN BLD-MCNC: 10 MG/DL (ref 9–23)
BUN/CREAT SERPL: 14.3 (ref 10–20)
CALCIUM BLD-MCNC: 10 MG/DL (ref 8.7–10.4)
CHLORIDE SERPL-SCNC: 107 MMOL/L (ref 98–112)
CO2 SERPL-SCNC: 26 MMOL/L (ref 21–32)
COLOR UR: YELLOW
CREAT BLD-MCNC: 0.7 MG/DL
DEPRECATED RDW RBC AUTO: 40 FL (ref 35.1–46.3)
EGFRCR SERPLBLD CKD-EPI 2021: 126 ML/MIN/1.73M2 (ref 60–?)
EOSINOPHIL # BLD AUTO: 0.18 X10(3) UL (ref 0–0.7)
EOSINOPHIL NFR BLD AUTO: 3.1 %
ERYTHROCYTE [DISTWIDTH] IN BLOOD BY AUTOMATED COUNT: 12.6 % (ref 11–15)
GLOBULIN PLAS-MCNC: 3.1 G/DL (ref 2–3.5)
GLUCOSE BLD-MCNC: 89 MG/DL (ref 70–99)
GLUCOSE UR-MCNC: NORMAL MG/DL
HCT VFR BLD AUTO: 41.7 %
HGB BLD-MCNC: 13.8 G/DL
IMM GRANULOCYTES # BLD AUTO: 0.01 X10(3) UL (ref 0–1)
IMM GRANULOCYTES NFR BLD: 0.2 %
KETONES UR-MCNC: NEGATIVE MG/DL
LEUKOCYTE ESTERASE UR QL STRIP.AUTO: 25
LIPASE SERPL-CCNC: 29 U/L (ref 12–53)
LYMPHOCYTES # BLD AUTO: 1.76 X10(3) UL (ref 1–4)
LYMPHOCYTES NFR BLD AUTO: 30.5 %
MCH RBC QN AUTO: 28.8 PG (ref 26–34)
MCHC RBC AUTO-ENTMCNC: 33.1 G/DL (ref 31–37)
MCV RBC AUTO: 86.9 FL
MONOCYTES # BLD AUTO: 0.35 X10(3) UL (ref 0.1–1)
MONOCYTES NFR BLD AUTO: 6.1 %
NEUTROPHILS # BLD AUTO: 3.42 X10 (3) UL (ref 1.5–7.7)
NEUTROPHILS # BLD AUTO: 3.42 X10(3) UL (ref 1.5–7.7)
NEUTROPHILS NFR BLD AUTO: 59.2 %
NITRITE UR QL STRIP.AUTO: NEGATIVE
OSMOLALITY SERPL CALC.SUM OF ELEC: 291 MOSM/KG (ref 275–295)
PH UR: 6 [PH] (ref 5–8)
PLATELET # BLD AUTO: 290 10(3)UL (ref 150–450)
POTASSIUM SERPL-SCNC: 4.6 MMOL/L (ref 3.5–5.1)
PROT SERPL-MCNC: 8 G/DL (ref 5.7–8.2)
RBC # BLD AUTO: 4.8 X10(6)UL
SODIUM SERPL-SCNC: 141 MMOL/L (ref 136–145)
SP GR UR STRIP: 1.02 (ref 1–1.03)
UROBILINOGEN UR STRIP-ACNC: NORMAL
WBC # BLD AUTO: 5.8 X10(3) UL (ref 4–11)

## 2024-09-30 PROCEDURE — 87086 URINE CULTURE/COLONY COUNT: CPT | Performed by: EMERGENCY MEDICINE

## 2024-09-30 PROCEDURE — 87186 SC STD MICRODIL/AGAR DIL: CPT | Performed by: EMERGENCY MEDICINE

## 2024-09-30 PROCEDURE — 99284 EMERGENCY DEPT VISIT MOD MDM: CPT

## 2024-09-30 PROCEDURE — 85025 COMPLETE CBC W/AUTO DIFF WBC: CPT | Performed by: EMERGENCY MEDICINE

## 2024-09-30 PROCEDURE — 83690 ASSAY OF LIPASE: CPT

## 2024-09-30 PROCEDURE — 81001 URINALYSIS AUTO W/SCOPE: CPT | Performed by: EMERGENCY MEDICINE

## 2024-09-30 PROCEDURE — 87088 URINE BACTERIA CULTURE: CPT | Performed by: EMERGENCY MEDICINE

## 2024-09-30 PROCEDURE — 85025 COMPLETE CBC W/AUTO DIFF WBC: CPT

## 2024-09-30 PROCEDURE — 96375 TX/PRO/DX INJ NEW DRUG ADDON: CPT

## 2024-09-30 PROCEDURE — 81025 URINE PREGNANCY TEST: CPT

## 2024-09-30 PROCEDURE — 76705 ECHO EXAM OF ABDOMEN: CPT | Performed by: EMERGENCY MEDICINE

## 2024-09-30 PROCEDURE — 80053 COMPREHEN METABOLIC PANEL: CPT

## 2024-09-30 PROCEDURE — 83690 ASSAY OF LIPASE: CPT | Performed by: EMERGENCY MEDICINE

## 2024-09-30 PROCEDURE — 96374 THER/PROPH/DIAG INJ IV PUSH: CPT

## 2024-09-30 PROCEDURE — 80053 COMPREHEN METABOLIC PANEL: CPT | Performed by: EMERGENCY MEDICINE

## 2024-09-30 RX ORDER — ONDANSETRON 4 MG/1
4 TABLET, ORALLY DISINTEGRATING ORAL EVERY 4 HOURS PRN
Qty: 12 TABLET | Refills: 0 | Status: SHIPPED | OUTPATIENT
Start: 2024-09-30 | End: 2024-10-07

## 2024-09-30 RX ORDER — CEPHALEXIN 500 MG/1
500 CAPSULE ORAL 3 TIMES DAILY
Qty: 15 CAPSULE | Refills: 0 | Status: SHIPPED | OUTPATIENT
Start: 2024-09-30 | End: 2024-10-05

## 2024-09-30 RX ORDER — KETOROLAC TROMETHAMINE 15 MG/ML
15 INJECTION, SOLUTION INTRAMUSCULAR; INTRAVENOUS ONCE
Status: COMPLETED | OUTPATIENT
Start: 2024-09-30 | End: 2024-09-30

## 2024-09-30 RX ORDER — ONDANSETRON 2 MG/ML
4 INJECTION INTRAMUSCULAR; INTRAVENOUS ONCE
Status: COMPLETED | OUTPATIENT
Start: 2024-09-30 | End: 2024-09-30

## 2024-10-01 NOTE — ED PROVIDER NOTES
Patient Seen in: [facility] Emergency Department    History     Chief Complaint   Patient presents with    Abdomen/Flank Pain     Stated Complaint: abd pain    HPI    Patient complains of ruq abdominal pain that began last night.  Pain described as crampy.  Pain rated as 5/10.  Modifying factors include: non3.          Past Medical History:    Autism (HCC)    Environmental allergies    Ovarian cyst       History reviewed. No pertinent surgical history.         Family History   Problem Relation Age of Onset    Hypertension Mother     Heart Disorder Mother 42         MI - s/p 3 stents     Lipids Mother     Diabetes Maternal Grandfather     Heart Disorder Maternal Grandfather     Hypertension Maternal Grandfather     Lipids Other         HYPERLIPIDEMIA, FAMILY H/O    Heart Disease Other         CORONARY ARTERY DISEASE, FAMILY H/O    Asthma Neg     Cancer Neg        Social History     Socioeconomic History    Marital status: Single   Occupational History    Occupation: Student     Comment: studying education @ [employer].   Tobacco Use    Smoking status: Never     Passive exposure: Past    Smokeless tobacco: Never    Tobacco comments:     Father smokes outside   Vaping Use    Vaping status: Never Used   Substance and Sexual Activity    Alcohol use: Never     Alcohol/week: 0.0 standard drinks of alcohol    Drug use: Never    Sexual activity: Yes     Partners: Male     Birth control/protection: Condom   Other Topics Concern    Second-hand smoke exposure No    Alcohol/drug concerns No    Violence concerns No    Blood Transfusions No   Social History Narrative    Live with patients, aunt, sibling, grandmother and dogs    No h/o abuse       Review of Systems    Positive for stated complaint: abd pain  Other systems are as noted in HPI.  Constitutional and vital signs reviewed.      All other systems reviewed and negative except as noted above.    PSFH elements reviewed from today and agreed except as otherwise stated in  HPI.    Physical Exam     ED Triage Vitals [09/30/24 1228]   BP (!) 138/91   Pulse 82   Resp 18   Temp 97.4 °F (36.3 °C)   Temp src Temporal   SpO2 98 %   O2 Device None (Room air)       Current:BP (!) 138/91   Pulse 82   Temp 97.4 °F (36.3 °C) (Temporal)   Resp 18   LMP 09/30/2024 (Exact Date)   SpO2 98%   Breastfeeding Unknown   Pulse ox nl        Physical Exam  General Appearance: alert, no distress  Eyes: pupils equal and round no pallor or injection  ENT, Mouth: mucous membranes moist  Respiratory: there are no retractions, lungs are clear to auscultation  Cardiovascular: regular rate and rhythm    Gastrointestinal: soft tender epigastric ruq and r flank  Neurological: II-XII grossly intact  no focal deficits  Skin: warm and dry, no rashes.  Musculoskeletal: neck is supple non tender        Extremities are symmetrical, full range of motion  Psychiatric: patient is pleasant, there is no agitation    DIFFERENTIAL DIAGNOSIS: After history and physical exam differential diagnosis was considered for  biliary colic vs. Enteritis vs. pyelo          ED Course     Labs Reviewed   COMP METABOLIC PANEL (14) - Abnormal; Notable for the following components:       Result Value    ALT 8 (*)     Albumin 4.9 (*)     All other components within normal limits   URINALYSIS WITH CULTURE REFLEX - Abnormal; Notable for the following components:    Clarity Urine Ex.Turbid (*)     Blood Urine 3+ (*)     Protein Urine Trace (*)     Leukocyte Esterase Urine 25 (*)     WBC Urine 6-10 (*)     RBC Urine 6-10 (*)     Bacteria Urine 2+ (*)     Squamous Epi. Cells Many (*)     All other components within normal limits   LIPASE - Normal   POCT PREGNANCY URINE - Normal   CBC WITH DIFFERENTIAL WITH PLATELET   URINE CULTURE, ROUTINE       MDM         Cardiac Monitor:   Pulse Readings from Last 1 Encounters:   09/30/24 82   , ,   interpreted by me.    Radiology findings:  I personally reviewed the images. US GALLBLADDER (CPT=76705)    Result  Date: 9/30/2024  CONCLUSION:  1. Normal gallbladder.  No well-defined gallstones or biliary obstruction.  No well-defined sonographic Espinoza sign , but the patient is tender upon palpation of the epigastric region.  Correlate clinically and possibly with CT scanning is advised. 2. Pancreas obscured by bowel gas.  Poor visualization of the liver.  Normal right kidney.    Dictated by (CST): Anatoly Grullon MD on 9/30/2024 at 4:43 PM     Finalized by (CST): Anatoly Grullon MD on 9/30/2024 at 4:45 PM                 Medical Decision Making  Problems Addressed:  Abdominal pain, right upper quadrant: acute illness or injury  Biliary colic: acute illness or injury  Urinary tract infection without hematuria, site unspecified: acute illness or injury    Amount and/or Complexity of Data Reviewed  Labs: ordered. Decision-making details documented in ED Course.  Radiology: ordered. Decision-making details documented in ED Course.  Discussion of management or test interpretation with external provider(s): Tylenol, motrin recommended.    Risk  OTC drugs.  Prescription drug management.            Disposition and Plan     Clinical Impression:  1. Biliary colic    2. Abdominal pain, right upper quadrant    3. Urinary tract infection without hematuria, site unspecified        Disposition:  Discharge    Follow-up:  Cheryl Schaefer MD  [address] [place], [place] [id number] [phone]    Follow up        Medications Prescribed:  Discharge Medication List as of 9/30/2024  5:48 PM        START taking these medications    Details   ondansetron 4 MG Oral Tablet Dispersible Take 1 tablet (4 mg total) by mouth every 4 (four) hours as needed for Nausea., Normal, Disp-12 tablet, R-0      cephALEXin 500 MG Oral Cap Take 1 capsule (500 mg total) by mouth 3 (three) times daily for 5 days., Normal, Disp-15 capsule, R-0

## 2024-10-02 RX ORDER — SULFAMETHOXAZOLE/TRIMETHOPRIM 800-160 MG
1 TABLET ORAL 2 TIMES DAILY
Qty: 20 TABLET | Refills: 0 | Status: SHIPPED | OUTPATIENT
Start: 2024-10-02 | End: 2024-10-12

## 2024-10-02 NOTE — LETTER
VACCINE ADMINISTRATION RECORD  PARENT / GUARDIAN APPROVAL  Date: 2019  Vaccine administered to: Melani Curry     : 2005    MRN: [id number]    A copy of the appropriate Centers for Disease Control and Prevention Vaccine Information statement h Home

## 2024-10-12 ENCOUNTER — LAB ENCOUNTER (OUTPATIENT)
Dept: LAB | Age: 19
End: 2024-10-12
Attending: INTERNAL MEDICINE
Payer: COMMERCIAL

## 2024-10-12 ENCOUNTER — OFFICE VISIT (OUTPATIENT)
Dept: INTERNAL MEDICINE CLINIC | Facility: CLINIC | Age: 19
End: 2024-10-12

## 2024-10-12 VITALS
DIASTOLIC BLOOD PRESSURE: 80 MMHG | SYSTOLIC BLOOD PRESSURE: 127 MMHG | HEART RATE: 79 BPM | TEMPERATURE: 98 F | BODY MASS INDEX: 30.83 KG/M2 | HEIGHT: 61 IN | RESPIRATION RATE: 16 BRPM | WEIGHT: 163.31 LBS

## 2024-10-12 DIAGNOSIS — E55.9 VITAMIN D DEFICIENCY: ICD-10-CM

## 2024-10-12 DIAGNOSIS — N92.6 IRREGULAR MENSTRUAL CYCLE: ICD-10-CM

## 2024-10-12 DIAGNOSIS — R79.89 ELEVATED TSH: ICD-10-CM

## 2024-10-12 DIAGNOSIS — Z00.00 PHYSICAL EXAM, ANNUAL: ICD-10-CM

## 2024-10-12 DIAGNOSIS — Z00.00 PHYSICAL EXAM, ANNUAL: Primary | ICD-10-CM

## 2024-10-12 DIAGNOSIS — Z01.419 ENCOUNTER FOR ROUTINE GYNECOLOGICAL EXAMINATION WITH PAPANICOLAOU SMEAR OF CERVIX: ICD-10-CM

## 2024-10-12 DIAGNOSIS — Z23 NEED FOR VACCINATION: ICD-10-CM

## 2024-10-12 LAB
ALBUMIN SERPL-MCNC: 5.1 G/DL (ref 3.2–4.8)
ALBUMIN/GLOB SERPL: 1.8 {RATIO} (ref 1–2)
ALP LIVER SERPL-CCNC: 113 U/L
ALT SERPL-CCNC: 11 U/L
ANION GAP SERPL CALC-SCNC: 9 MMOL/L (ref 0–18)
AST SERPL-CCNC: 19 U/L (ref ?–34)
BILIRUB SERPL-MCNC: 0.3 MG/DL (ref 0.3–1.2)
BUN BLD-MCNC: 9 MG/DL (ref 9–23)
BUN/CREAT SERPL: 11 (ref 10–20)
CALCIUM BLD-MCNC: 9.8 MG/DL (ref 8.7–10.4)
CHLORIDE SERPL-SCNC: 103 MMOL/L (ref 98–112)
CHOLEST SERPL-MCNC: 151 MG/DL (ref ?–200)
CO2 SERPL-SCNC: 24 MMOL/L (ref 21–32)
CREAT BLD-MCNC: 0.82 MG/DL
DEPRECATED RDW RBC AUTO: 39.9 FL (ref 35.1–46.3)
EGFRCR SERPLBLD CKD-EPI 2021: 104 ML/MIN/1.73M2 (ref 60–?)
ERYTHROCYTE [DISTWIDTH] IN BLOOD BY AUTOMATED COUNT: 12.6 % (ref 11–15)
FASTING PATIENT LIPID ANSWER: YES
FASTING STATUS PATIENT QL REPORTED: YES
GLOBULIN PLAS-MCNC: 2.9 G/DL (ref 2–3.5)
GLUCOSE BLD-MCNC: 88 MG/DL (ref 70–99)
HCT VFR BLD AUTO: 41.2 %
HDLC SERPL-MCNC: 40 MG/DL (ref 40–59)
HGB BLD-MCNC: 13.7 G/DL
LDLC SERPL CALC-MCNC: 90 MG/DL (ref ?–100)
MCH RBC QN AUTO: 28.9 PG (ref 26–34)
MCHC RBC AUTO-ENTMCNC: 33.3 G/DL (ref 31–37)
MCV RBC AUTO: 86.9 FL
NONHDLC SERPL-MCNC: 111 MG/DL (ref ?–130)
OSMOLALITY SERPL CALC.SUM OF ELEC: 280 MOSM/KG (ref 275–295)
PLATELET # BLD AUTO: 277 10(3)UL (ref 150–450)
POTASSIUM SERPL-SCNC: 4.5 MMOL/L (ref 3.5–5.1)
PROT SERPL-MCNC: 8 G/DL (ref 5.7–8.2)
RBC # BLD AUTO: 4.74 X10(6)UL
SODIUM SERPL-SCNC: 136 MMOL/L (ref 136–145)
T3FREE SERPL-MCNC: 3.52 PG/ML (ref 2.4–4.2)
T4 FREE SERPL-MCNC: 1 NG/DL (ref 0.8–1.7)
TRIGL SERPL-MCNC: 117 MG/DL (ref 30–149)
TSI SER-ACNC: 7.32 MIU/ML (ref 0.55–4.78)
VIT D+METAB SERPL-MCNC: 24.7 NG/ML (ref 30–100)
VLDLC SERPL CALC-MCNC: 19 MG/DL (ref 0–30)
WBC # BLD AUTO: 5.8 X10(3) UL (ref 4–11)

## 2024-10-12 PROCEDURE — 84439 ASSAY OF FREE THYROXINE: CPT

## 2024-10-12 PROCEDURE — 36415 COLL VENOUS BLD VENIPUNCTURE: CPT

## 2024-10-12 PROCEDURE — 85027 COMPLETE CBC AUTOMATED: CPT

## 2024-10-12 PROCEDURE — 80053 COMPREHEN METABOLIC PANEL: CPT

## 2024-10-12 PROCEDURE — 84481 FREE ASSAY (FT-3): CPT

## 2024-10-12 PROCEDURE — 82306 VITAMIN D 25 HYDROXY: CPT

## 2024-10-12 PROCEDURE — 80061 LIPID PANEL: CPT

## 2024-10-12 PROCEDURE — 99395 PREV VISIT EST AGE 18-39: CPT | Performed by: INTERNAL MEDICINE

## 2024-10-12 PROCEDURE — 84443 ASSAY THYROID STIM HORMONE: CPT

## 2024-10-12 NOTE — PROGRESS NOTES
Karen Oglesby is a 19 year old female.  Chief Complaint   Patient presents with    Physical     Annual physical  Refuse flu vaccine       HPI:   Pt comes for her annual physical   C/c annual physical   C/o overall doing well   [place] dr in  [place] spectrim [place]   Recently went to the ER for abdominal pain and was diagnosed with UTI, gallbladder ultrasound was normal  A total of 2 weeks of work/school because of her pain-goes to school couple days per week and goes to work few days a week so total of 2 weeks and she would like to go back to work on Monday and needs a letter  Before that she was tested and diagnosed for \"chemical pregnancy\" because the tests at home would be positive but the tests and the office were negative and she did see GYN for this  Noted she missed        HISTORY     needs forms filled -for [employer]--reasonable accommodations as well as what reasonable accommodations necessary for the student to be successful in school  C/o IUP - on autism spectrum -- has comprehension but may need a little more time for tests , may need to record lectures - has had this daig since age 1 yo -- born with zero apgar score and was diag with developmental delay    Not on meds or was on any meds   Yet before she went to 10th grade she did have an evaluation and meets the criteria for receiving individualized educational services that provide her with support and remediation in school due to cognitive difficulties associated with the autism spectrum from disorder, specific learning disabilities, anxiety and depression     Since last visit , she did see her gyn and on ocps and periods are now regular      HISTORY  New pt - here with mom cami who works with dr cancino   C/c establish care   C/o physical   irregualr periods    LMP 3/2021   Has ?  h/o covid  week -treated as cvoid bc she had all sympt except fever and everyone else   Also had a  rash -now bumpy , occ itchy      Lives with parents - twin and brother   Graduated high school         PMH  H/o covid ???   irregualr periods  Autism spectrum disorder    Current Outpatient Medications   Medication Sig Dispense Refill    sulfamethoxazole-trimethoprim -160 MG Oral Tab per tablet Take 1 tablet by mouth 2 (two) times daily for 10 days. 20 tablet 0      Past Medical History:    Autism (HCC)    Environmental allergies    Ovarian cyst      No past surgical history on file.   Social History:  Social History     Socioeconomic History    Marital status: Single   Occupational History    Occupation: Student     Comment: studying education @ [employer].   Tobacco Use    Smoking status: Never     Passive exposure: Past    Smokeless tobacco: Never    Tobacco comments:     Father smokes outside   Vaping Use    Vaping status: Never Used   Substance and Sexual Activity    Alcohol use: Never     Alcohol/week: 0.0 standard drinks of alcohol    Drug use: Never    Sexual activity: Yes     Partners: Male     Birth control/protection: Condom   Other Topics Concern    Second-hand smoke exposure No    Alcohol/drug concerns No    Violence concerns No    Blood Transfusions No   Social History Narrative    Live with patients, aunt, sibling, grandmother and dogs    No h/o abuse        REVIEW OF SYSTEMS:   GENERAL HEALTH: No fevers, chills, sweats, fatigue  VISION: No recent vision problems, blurry vision or double vision  HEENT: No decreased hearing ear pain nasal congestion or sore throat  SKIN: denies any unusual skin lesions or rashes  RESPIRATORY: denies shortness of breath, cough, wheezing  CARDIOVASCULAR: denies chest pain on exertion, palpitations, swelling in feet  GI: + abdominal pain and denies heartburn, + nausea but no  vomiting-- on abx for her UTI   : No Pain on urination, change in the color of urine, discharge, + urinating frequently but drinking a lot of water   MUS: + back pain, no joint pain or  muscle  pain  NEURO: denies headaches , anxiety, depression    EXAM:   /80 (BP Location: Left arm, Patient Position: Sitting, Cuff Size: adult)   Pulse 79   Temp 98 °F (36.7 °C) (Oral)   Resp 16   Ht 5' 1\" (1.549 m)   Wt 163 lb 4.8 oz (74.1 kg)   LMP 09/30/2024 (Exact Date)   BMI 30.86 kg/m²   GENERAL: well developed, well nourished,in no apparent distress  SKIN: no rashes,no suspicious lesions  HEENT: atraumatic, normocephalic,ears and throat are clear, no frontal or maxillary sinus tenderness, pupils equal reactive to light bilaterally, extraocular muscles intact  NECK: supple,no adenopathy,nontender   LUNGS: clear to auscultation, no wheeze  CARDIO: RRR without murmur  GI: good BS's,no masses or tenderness  EXTREMITIES: no cyanosis, or edema  Breast exam, pelvic exam and Pap smear to be done by GYN    ASSESSMENT AND PLAN:   Diagnoses and all orders for this visit:    Physical exam, annual  -     Comp Metabolic Panel (14); Future  -     Lipid Panel; Future  -     Vitamin D; Future  -     CBC, Platelet; No Differential; Future  -     Assay, Thyroid Stim Hormone; Future  -     Thyroxine, Free [E]; Future  -     Free T3 (Triiodothryronine); Future    Vitamin D deficiency  -     Vitamin D; Future    Elevated TSH  -     Assay, Thyroid Stim Hormone; Future  -     Thyroxine, Free [E]; Future  -     Free T3 (Triiodothryronine); Future    Irregular menstrual cycle  -     Assay, Thyroid Stim Hormone; Future  -     Thyroxine, Free [E]; Future  -     Free T3 (Triiodothryronine); Future  -     OBG - INTERNAL    Encounter for routine gynecological examination with Papanicolaou smear of cervix  -     OBG - INTERNAL      Advised patient to follow a healthy diet watching what she eats and exercise seatbelt use no texting and driving sunscreen use  She will come back for her Tdap  Aware to follow-up with GYN for Pap and workup for any irregular periods etc   Provided letter to return to work Monday October 14, 2024   refer to  GYN, , Recheck thyroid function tests           Preventive medicine  Labs 9/2024  reviewed   Gyn - dr lama     The patient indicates understanding of these issues and agrees to the plan.  No follow-ups on file.

## 2024-10-20 ENCOUNTER — PATIENT MESSAGE (OUTPATIENT)
Dept: INTERNAL MEDICINE CLINIC | Facility: CLINIC | Age: 19
End: 2024-10-20

## 2024-10-22 NOTE — TELEPHONE ENCOUNTER
Dr. Schaefer - patient/mother is requesting a letter for school stating she was ill with Covid in 2021. Please advise.    Please respond directly to the patient if no additional staff support is required.

## 2024-10-23 NOTE — TELEPHONE ENCOUNTER
Pt had not been seen since July 2021 to aug 2022 but it is not unlikely that this is true espcially since her moms covid test was positive   I will send letter that I feel is most appropriate according to documentattion

## 2024-11-22 ENCOUNTER — OFFICE VISIT (OUTPATIENT)
Dept: INTERNAL MEDICINE CLINIC | Facility: CLINIC | Age: 19
End: 2024-11-22

## 2024-11-22 ENCOUNTER — HOSPITAL ENCOUNTER (OUTPATIENT)
Dept: GENERAL RADIOLOGY | Age: 19
Discharge: HOME | End: 2024-11-22
Attending: INTERNAL MEDICINE
Payer: COMMERCIAL

## 2024-11-22 ENCOUNTER — LAB ENCOUNTER (OUTPATIENT)
Dept: LAB | Age: 19
End: 2024-11-22
Attending: INTERNAL MEDICINE
Payer: COMMERCIAL

## 2024-11-22 VITALS
DIASTOLIC BLOOD PRESSURE: 80 MMHG | TEMPERATURE: 98 F | SYSTOLIC BLOOD PRESSURE: 135 MMHG | WEIGHT: 163.63 LBS | HEIGHT: 61 IN | HEART RATE: 68 BPM | BODY MASS INDEX: 30.89 KG/M2 | OXYGEN SATURATION: 99 %

## 2024-11-22 DIAGNOSIS — R07.9 CHEST PAIN, UNSPECIFIED TYPE: ICD-10-CM

## 2024-11-22 DIAGNOSIS — J06.9 VIRAL UPPER RESPIRATORY TRACT INFECTION: ICD-10-CM

## 2024-11-22 DIAGNOSIS — J06.9 VIRAL UPPER RESPIRATORY TRACT INFECTION: Primary | ICD-10-CM

## 2024-11-22 LAB
ATRIAL RATE: 68 BPM
P AXIS: 69 DEGREES
P-R INTERVAL: 140 MS
Q-T INTERVAL: 380 MS
QRS DURATION: 78 MS
QTC CALCULATION (BEZET): 404 MS
R AXIS: 73 DEGREES
T AXIS: 44 DEGREES
VENTRICULAR RATE: 68 BPM

## 2024-11-22 PROCEDURE — 93010 ELECTROCARDIOGRAM REPORT: CPT | Performed by: INTERNAL MEDICINE

## 2024-11-22 PROCEDURE — 93005 ELECTROCARDIOGRAM TRACING: CPT

## 2024-11-22 PROCEDURE — 99213 OFFICE O/P EST LOW 20 MIN: CPT | Performed by: INTERNAL MEDICINE

## 2024-11-22 PROCEDURE — 71046 X-RAY EXAM CHEST 2 VIEWS: CPT | Performed by: INTERNAL MEDICINE

## 2024-11-22 RX ORDER — ALBUTEROL SULFATE 90 UG/1
1 INHALANT RESPIRATORY (INHALATION) EVERY 6 HOURS PRN
Qty: 3 EACH | Refills: 0 | Status: SHIPPED | OUTPATIENT
Start: 2024-11-22

## 2024-11-22 RX ORDER — ACETAMINOPHEN 500 MG
500 TABLET ORAL EVERY 6 HOURS PRN
COMMUNITY

## 2024-11-22 RX ORDER — BENZONATATE 200 MG/1
200 CAPSULE ORAL 3 TIMES DAILY PRN
Qty: 30 CAPSULE | Refills: 0 | Status: SHIPPED | OUTPATIENT
Start: 2024-11-22

## 2024-11-22 NOTE — PATIENT INSTRUCTIONS
Please have chest X ray and EKG done.     Please start albuterol 1 puff every 4-6 hours as needed for cough and chest tightness.     Start cough suppressant as needed every 8 hours.     Start ibuprofen 400 mg every 12 hours for chest/rib pain. Can try heating pad as needed. Can also use voltaren gel aka diclofenac gel (OTC) up to 4x/day.

## 2024-11-22 NOTE — PROGRESS NOTES
Karen Oglesby is a 19 year old female with complaints of:  Chief Complaint: Chest Pain (Has been feeling sick since 11/19. Woke up with chest pain this morning.)    HPI     Karen Oglesby is a(n) 19 year old female with a history of autism, environmental allergies, who presents for chest pain.     Patient has been sick for 4 days. She started to have sore throat. Later developed congestion and had worsening of sore throat. Today, she woke up with chest pain, worse with coughing and deep breathing. Better with leaning forward. She thought she had a fever last night, but did not check.     Past Medical History     Past Medical History:    Autism (HCC)    Environmental allergies    Ovarian cyst        Past Surgical History     No past surgical history on file.     Family History     Family History   Problem Relation Age of Onset    Hypertension Mother     Heart Disorder Mother 42         MI - s/p 3 stents     Lipids Mother     Diabetes Maternal Grandfather     Heart Disorder Maternal Grandfather     Hypertension Maternal Grandfather     Lipids Other         HYPERLIPIDEMIA, FAMILY H/O    Heart Disease Other         CORONARY ARTERY DISEASE, FAMILY H/O    Asthma Neg     Cancer Neg        Social History     Social History     Socioeconomic History    Marital status: Single   Occupational History    Occupation: Student     Comment: studying education @ [employer].   Tobacco Use    Smoking status: Never     Passive exposure: Past    Smokeless tobacco: Never    Tobacco comments:     Father smokes outside   Vaping Use    Vaping status: Never Used   Substance and Sexual Activity    Alcohol use: Never     Alcohol/week: 0.0 standard drinks of alcohol    Drug use: Never    Sexual activity: Yes     Partners: Male     Birth control/protection: Condom   Other Topics Concern    Second-hand smoke exposure No    Alcohol/drug concerns No    Violence concerns No    Blood Transfusions No   Social History Narrative    Live with patients,  aunt, sibling, grandmother and dogs    No h/o abuse       Allergies     Allergies[1]    Current Medications     Current Outpatient Medications   Medication Sig Dispense Refill    acetaminophen 500 MG Oral Tab Take 1 tablet (500 mg total) by mouth every 6 (six) hours as needed for Pain.       No current facility-administered medications for this visit.       Review of Systems     GENERAL HEALTH: feels well otherwise  SKIN: denies any unusual skin lesions or rashes  RESPIRATORY: + shortness of breath with exertion  CARDIOVASCULAR: + chest pain, however not on exertion  GI: denies abdominal pain and denies heartburn  : denies any burning with urination, urinary frequency or urgency  NEURO: denies headaches, numbness or tingling, mental status changes  PSYCH: denies depressed mood, anxiety  MUSC: denies muscle aches, joint pain    Physical Exam     /80 (BP Location: Left arm, Patient Position: Sitting, Cuff Size: adult)   Pulse 68   Temp 98.1 °F (36.7 °C) (Oral)   Ht 5' 1\" (1.549 m)   Wt 163 lb 9.6 oz (74.2 kg)   LMP 10/21/2024 (Approximate)   SpO2 99%   BMI 30.91 kg/m²     GENERAL: well developed, well nourished,in no apparent distress  SKIN: no rashes,no suspicious lesions  HEENT: atraumatic, normocephalic,ears and throat are clear  NECK: supple,no adenopathy,no bruits  LUNGS: clear to auscultation  CARDIO: RRR without murmur  Chest wall: tender to palpation over sternum  GI: good BS's,no masses, HSM or tenderness  EXTREMITIES: no cyanosis, clubbing or edema    Assessment and Plan     Diagnoses and all orders for this visit:    Viral upper respiratory tract infection. Check for flu/COVID/RSV, and treat with appropriate antiviral as needed. Check EKG for signs of pericarditis. Start ibuprofen 400 mg BID, voltaren gel, heat pad for chest pain. Follow up in 1-2 weeks if not better.   -     EKG 12 Lead; Future  -     XR CHEST AP/PA (1 VIEW) (CPT=71045); Future  -     SARS-CoV-2/Flu A and B/RSV by PCR  ([facility]) [E] *Collect in Office!; Future  -     albuterol 108 (90 Base) MCG/ACT Inhalation Aero Soln; Inhale 1 puff into the lungs every 6 (six) hours as needed for Wheezing (cough).  -     benzonatate 200 MG Oral Cap; Take 1 capsule (200 mg total) by mouth 3 (three) times daily as needed.     acetaminophen 500 MG Oral Tab Take 1 tablet (500 mg total) by mouth every 6 (six) hours as needed for Pain.      albuterol 108 (90 Base) MCG/ACT Inhalation Aero Soln Inhale 1 puff into the lungs every 6 (six) hours as needed for Wheezing (cough). 3 each 0    benzonatate 200 MG Oral Cap Take 1 capsule (200 mg total) by mouth 3 (three) times daily as needed. 30 capsule 0       Requested Prescriptions     Signed Prescriptions Disp Refills    albuterol 108 (90 Base) MCG/ACT Inhalation Aero Soln 3 each 0     Sig: Inhale 1 puff into the lungs every 6 (six) hours as needed for Wheezing (cough).    benzonatate 200 MG Oral Cap 30 capsule 0     Sig: Take 1 capsule (200 mg total) by mouth 3 (three) times daily as needed.       No orders of the defined types were placed in this encounter.      No follow-ups on file.    The patient indicates understanding of these issues and agrees to the plan.    Electronically signed by Suzi Tompkins MD 11/22/24             [1]   Allergies  Allergen Reactions    Junel 1-20 [Necon] PALPITATIONS     Heart racing, mood changes      Z-Good [Azithromycin] REACTIVE AIRWAY DISEASE

## 2024-11-23 LAB
FLUAV + FLUBV RNA SPEC NAA+PROBE: NOT DETECTED
FLUAV + FLUBV RNA SPEC NAA+PROBE: NOT DETECTED
RSV RNA SPEC NAA+PROBE: NOT DETECTED
SARS-COV-2 RNA RESP QL NAA+PROBE: NOT DETECTED

## 2024-12-16 NOTE — Clinical Note
----- Message from Arabella PAYNE RN sent at 12/13/2024  1:07 PM CST -----  Call Monday  ----- Message -----  From: Lakisha Lou MD  Sent: 12/13/2024   8:57 AM CST  To: [facility] Nurse Msg Pool    S/p robotic umbilical hernia repair with mesh today.  Please call on Monday to see how patient is doing.  Thanks!   Name:  Ramonabo Garcia Year:    Class: Student ID No.:   Address:  [address] [place] [address] Phone:  [phone] (home)  :  13year old   Name Relationship [address] Work Phone Home Phone Mobile Phone   1.  Constance Alas 12. Has anyone in your family had unexplained fainting, seizures, or near drowning? BONE AND JOINT QUESTIONS Yes No   17. Have you ever had an injury to a bone, muscle, ligament, or tendon that caused you to miss a practice or a game?      18. Have you /fall?     36. Have you ever become ill while exercising in the heat?     41. Do you get frequent muscle cramps when exercising? 42. Do you or someone in your family have sickle cell trait or disease? 43.  Have you ever had any problems with your ey Abdomen Yes    Genitourinary (males only)* N/A    Skin:  HSV, lesions suggestive of MRSA, tinea corporis Yes    Neurologic* Yes    MUSCULOSKELETAL     Neck Yes    Back Yes    Shoulder/arm Yes    Elbow/forearm Yes    Wrist/hand/fingers Yes    Hip/thigh Yes laboratory.  We further understand and agree that the results of the performance-enhancing substance testing may be provided to certain individuals in my/our student’s high school as specified in the [facility] Performance-Enhancing Substance Testing Program Prot

## 2025-01-07 ENCOUNTER — HOSPITAL ENCOUNTER (OUTPATIENT)
Age: 20
Discharge: HOME | End: 2025-01-07
Payer: COMMERCIAL

## 2025-01-07 ENCOUNTER — APPOINTMENT (OUTPATIENT)
Dept: GENERAL RADIOLOGY | Age: 20
End: 2025-01-07
Attending: NURSE PRACTITIONER
Payer: COMMERCIAL

## 2025-01-07 VITALS
DIASTOLIC BLOOD PRESSURE: 77 MMHG | OXYGEN SATURATION: 98 % | RESPIRATION RATE: 18 BRPM | TEMPERATURE: 98 F | SYSTOLIC BLOOD PRESSURE: 126 MMHG | HEART RATE: 68 BPM

## 2025-01-07 DIAGNOSIS — J06.9 VIRAL URI WITH COUGH: ICD-10-CM

## 2025-01-07 DIAGNOSIS — R05.2 SUBACUTE COUGH: Primary | ICD-10-CM

## 2025-01-07 PROCEDURE — 71046 X-RAY EXAM CHEST 2 VIEWS: CPT | Performed by: NURSE PRACTITIONER

## 2025-01-07 RX ORDER — BENZONATATE 100 MG/1
100 CAPSULE ORAL 3 TIMES DAILY PRN
Qty: 10 CAPSULE | Refills: 0 | Status: SHIPPED | OUTPATIENT
Start: 2025-01-07

## 2025-01-07 RX ORDER — FLUTICASONE PROPIONATE 50 MCG
2 SPRAY, SUSPENSION (ML) NASAL DAILY
Qty: 16 G | Refills: 0 | Status: SHIPPED | OUTPATIENT
Start: 2025-01-07

## 2025-01-07 NOTE — ED PROVIDER NOTES
Patient Seen in: [facility]      History     Chief Complaint   Patient presents with    Cough     Stated Complaint: Cough  Subjective:   19-year-old female with no past medical history presents from home.  Patient is here with cough and congestion.  Onset about 2 weeks ago.  States she initially started with a sore throat but that has resolved.  Having persistent cough and nasal congestion.  Some pressure in her ears.  She has had 2 negative COVID test at home.  No fever.  She is taking Delsym and Tylenol at home for symptoms.  States she had 1 isolated episode of pneumonia about 5 years ago.  She is a non-smoker.    The history is provided by the patient. No  was used.     Objective:   Past Medical History:    Autism (HCC)    Environmental allergies    Ovarian cyst            History reviewed. No pertinent surgical history.           Social History     Socioeconomic History    Marital status: Single   Occupational History    Occupation: Student     Comment: studying education @ [employer].   Tobacco Use    Smoking status: Never     Passive exposure: Past    Smokeless tobacco: Never    Tobacco comments:     Father smokes outside   Vaping Use    Vaping status: Never Used   Substance and Sexual Activity    Alcohol use: Never     Alcohol/week: 0.0 standard drinks of alcohol    Drug use: Never    Sexual activity: Yes     Partners: Male     Birth control/protection: Condom   Other Topics Concern    Second-hand smoke exposure No    Alcohol/drug concerns No    Violence concerns No    Blood Transfusions No   Social History Narrative    Live with patients, aunt, sibling, grandmother and dogs    No h/o abuse            Review of Systems    Positive for stated complaint: Cough    Other systems are as noted in HPI.  Constitutional and vital signs reviewed.      All other systems reviewed and negative except as noted above.    Physical Exam     ED Triage Vitals [01/07/25 0820]   /77   Pulse 68    Resp 18   Temp 98.2 °F (36.8 °C)   Temp src Oral   SpO2 98 %   O2 Device None (Room air)     Current:/77   Pulse 68   Temp 98.2 °F (36.8 °C) (Oral)   Resp 18   LMP 11/17/2024 (Exact Date)   SpO2 98%     Physical Exam  Vitals and nursing note reviewed.   Constitutional:       General: She is not in acute distress.     Appearance: Normal appearance. She is not ill-appearing or toxic-appearing.   HENT:      Head: Normocephalic and atraumatic.      Right Ear: Tympanic membrane, ear canal and external ear normal.      Left Ear: Tympanic membrane, ear canal and external ear normal.      Nose: Mucosal edema and congestion present.      Right Turbinates: Not enlarged.      Left Turbinates: Not enlarged.      Right Sinus: No maxillary sinus tenderness or frontal sinus tenderness.      Left Sinus: No maxillary sinus tenderness or frontal sinus tenderness.      Mouth/Throat:      Mouth: Mucous membranes are moist.      Pharynx: Oropharynx is clear.   Eyes:      Pupils: Pupils are equal, round, and reactive to light.   Cardiovascular:      Rate and Rhythm: Normal rate and regular rhythm.      Pulses: Normal pulses.   Pulmonary:      Effort: Pulmonary effort is normal. No respiratory distress.      Breath sounds: Normal breath sounds.      Comments: Lungs clear.  No adventitious lung sounds.  No distress.  No hypoxia.  Pulse ox 98% ra. Which is normal    Abdominal:      General: Abdomen is flat.      Palpations: Abdomen is soft.   Musculoskeletal:         General: No signs of injury. Normal range of motion.      Cervical back: Normal range of motion and neck supple.   Skin:     General: Skin is warm and dry.      Capillary Refill: Capillary refill takes less than 2 seconds.   Neurological:      General: No focal deficit present.      Mental Status: She is alert and oriented to person, place, and time.      GCS: GCS eye subscore is 4. GCS verbal subscore is 5. GCS motor subscore is 6.   Psychiatric:         Mood and  Affect: Mood normal.         Behavior: Behavior normal.         Thought Content: Thought content normal.         Judgment: Judgment normal.         ED Course   Radiology:  XR CHEST PA + LAT CHEST (CPT=71046)    Result Date: 1/7/2025  CONCLUSION:   Negative for radiographically evident acute intrathoracic process.    elm-remote  Dictated by (CST): Perez Gallagher MD on 1/07/2025 at 8:43 AM     Finalized by (CST): Perez Gallagher MD on 1/07/2025 at 8:43 AM         Labs Reviewed - No data to display    MDM     Medical Decision Making  Differential diagnoses reflecting the complexity of care include: COVID, pneumonia, viral URI  Patient is well-appearing here.  Lungs are clear.  No distress.  No hypoxia.  Pulse ox 98% room air which is normal.  No persistent cough on exam.  COVID testing deferred given length of symptoms and patient had 2 negative tests at home.  Patient is agreeable to hold off on further testing.  Chest x-ray is negative for pneumonia  No RSV testing available at this site  Symptoms appear viral    Plan of Care: Rx Flonase and Tessalon.  May continue home remedies.  Push fluids.  Warm steam to face.  Recommend recheck with primary doctor if no improvement    Results and plan of care discussed with the patient/family. They are in agreement with discharge. They understand to follow up with their primary doctor or the referral physician for further evaluation, especially if no improvement.  Also discussed the limitations of immediate care, patient is aware that if symptoms are worse they should go to the emergency room. Verbal and written discharge instructions were given.     My independent interpretation of studies of: No pneumonia on chest x-ray  Diagnostic tests and medications considered but not ordered were: No indication for antibiotics  Shared decision making was done by: Patient is agreeable to chest x-ray today  Comorbidities that add complexity to management include: Autism listed on  chart  External chart review was done and was noted: Seen 11/22 for cough with negative chest x-ray  History obtained by an independent source was from: N/A  Discussions and management was done with: N/A  Social determinants of health that affect care: N/A              Problems Addressed:  Subacute cough: acute illness or injury  Viral URI with cough: acute illness or injury    Amount and/or Complexity of Data Reviewed  Radiology: ordered and independent interpretation performed. Decision-making details documented in ED Course.    Risk  OTC drugs.  Prescription drug management.        Disposition and Plan     Clinical Impression:  1. Subacute cough    2. Viral URI with cough         Disposition:  Discharge  1/7/2025  8:46 am    Follow-up:  Cheryl Schaefer MD  [address] [place], [place] [id number] [phone]                Medications Prescribed:  Current Discharge Medication List        START taking these medications    Details   fluticasone propionate 50 MCG/ACT Nasal Suspension 2 sprays by Nasal route daily.  Qty: 16 g, Refills: 0      !! benzonatate 100 MG Oral Cap Take 1 capsule (100 mg total) by mouth 3 (three) times daily as needed for cough.  Qty: 10 capsule, Refills: 0       !! - Potential duplicate medications found. Please discuss with provider.

## 2025-01-07 NOTE — DISCHARGE INSTRUCTIONS
No pneumonia seen on the chest x-ray.  You may continue your over-the-counter remedies.  Add the nasal spray and the cough medicine.  Drink plenty of fluids.  Try Vicks on your chest.  Warm steam to your face.  Schedule follow-up with your primary doctor if no improvement

## 2025-01-13 ENCOUNTER — TELEPHONE (OUTPATIENT)
Dept: INTERNAL MEDICINE CLINIC | Facility: CLINIC | Age: 20
End: 2025-01-13

## 2025-01-13 NOTE — TELEPHONE ENCOUNTER
Patient (name and  verified) calling with an update on her cough symptoms that are not getting better. States she has had the cough for 3 weeks. Denies any fever or shortness of breath with sleeping. Patient is using Delsym for cough. Recommended patient schedule an appt for recheck.       Future Appointments   Date Time Provider Department Center   2025 10:20 AM Radha Cheng APRN [facility]

## 2025-01-14 ENCOUNTER — OFFICE VISIT (OUTPATIENT)
Dept: INTERNAL MEDICINE CLINIC | Facility: CLINIC | Age: 20
End: 2025-01-14

## 2025-01-14 VITALS
DIASTOLIC BLOOD PRESSURE: 79 MMHG | RESPIRATION RATE: 16 BRPM | OXYGEN SATURATION: 98 % | SYSTOLIC BLOOD PRESSURE: 125 MMHG | HEART RATE: 64 BPM | BODY MASS INDEX: 30.78 KG/M2 | HEIGHT: 61 IN | WEIGHT: 163 LBS

## 2025-01-14 DIAGNOSIS — J34.89 SINUS PRESSURE: ICD-10-CM

## 2025-01-14 DIAGNOSIS — J06.9 VIRAL URI WITH COUGH: Primary | ICD-10-CM

## 2025-01-14 PROCEDURE — 99213 OFFICE O/P EST LOW 20 MIN: CPT | Performed by: NURSE PRACTITIONER

## 2025-01-14 NOTE — PROGRESS NOTES
Karen Oglesby is a 19 year old female.  Chief Complaint   Patient presents with    Cough     Xs 3 weeks     HPI:   She presents with a cough, SOB with exertion, chest pain when taking a deep breath, sinus pressure, bilateral ear pain and headaches.   She states her symptoms started 3 weeks.   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     Her symptoms started with body aches and sore throat. She did have a negative home covid test.     No recent fevers.   She went to  on 1/7.   CXR 1/7 Negative for radiographically evident acute intrathoracic process.   She was prescribed Flonase, tessalon pearls and albuterol inhaler. She did not start the medication.   Current Outpatient Medications   Medication Sig Dispense Refill    acetaminophen 500 MG Oral Tab Take 1 tablet (500 mg total) by mouth every 6 (six) hours as needed for Pain.      albuterol 108 (90 Base) MCG/ACT Inhalation Aero Soln Inhale 1 puff into the lungs every 6 (six) hours as needed for Wheezing (cough). 3 each 0    benzonatate 200 MG Oral Cap Take 1 capsule (200 mg total) by mouth 3 (three) times daily as needed. 30 capsule 0    fluticasone propionate 50 MCG/ACT Nasal Suspension 2 sprays by Nasal route daily. (Patient not taking: Reported on 1/14/2025) 16 g 0      Past Medical History:    Autism (HCC)    Environmental allergies    Ovarian cyst      History reviewed. No pertinent surgical history.   Social History:  Social History     Socioeconomic History    Marital status: Single   Occupational History    Occupation: Student     Comment: studying education @ [employer].   Tobacco Use    Smoking status: Never     Passive exposure: Past    Smokeless tobacco: Never    Tobacco comments:     Father smokes outside   Vaping Use    Vaping status: Never Used   Substance and Sexual Activity    Alcohol use: Never     Alcohol/week: 0.0 standard drinks of alcohol    Drug use: Never    Sexual activity: Yes     Partners: Male     Birth control/protection:  Condom   Other Topics Concern    Second-hand smoke exposure No    Alcohol/drug concerns No    Violence concerns No    Blood Transfusions No   Social History Narrative    Live with patients, aunt, sibling, grandmother and dogs    No h/o abuse      Family History   Problem Relation Age of Onset    Hypertension Mother     Heart Disorder Mother 42         MI - s/p 3 stents     Lipids Mother     Diabetes Maternal Grandfather     Heart Disorder Maternal Grandfather     Hypertension Maternal Grandfather     Lipids Other         HYPERLIPIDEMIA, FAMILY H/O    Heart Disease Other         CORONARY ARTERY DISEASE, FAMILY H/O    Asthma Neg     Cancer Neg       Allergies[1]     REVIEW OF SYSTEMS:     Review of Systems   Constitutional:  Negative for fatigue and fever.   HENT:  Positive for ear pain and sinus pressure. Negative for congestion, rhinorrhea and sore throat.    Respiratory:  Positive for cough and shortness of breath (with exertion). Negative for wheezing.    Cardiovascular:  Negative for chest pain.   Gastrointestinal:  Negative for abdominal pain.   Genitourinary: Negative.    Musculoskeletal:  Negative for arthralgias.   Skin: Negative.    Neurological:  Positive for headaches. Negative for dizziness.   Psychiatric/Behavioral: Negative.        Wt Readings from Last 5 Encounters:   01/14/25 163 lb (73.9 kg)   11/22/24 163 lb 9.6 oz (74.2 kg)   10/12/24 163 lb 4.8 oz (74.1 kg)   07/17/24 159 lb (72.1 kg)   01/24/24 152 lb (68.9 kg)     Body mass index is 30.8 kg/m².      EXAM:   /79 (BP Location: Right arm, Patient Position: Sitting, Cuff Size: large)   Pulse 64   Resp 16   Ht 5' 1\" (1.549 m)   Wt 163 lb (73.9 kg)   LMP 11/17/2024 (Exact Date)   SpO2 98%   BMI 30.80 kg/m²     Physical Exam  Vitals reviewed.   Constitutional:       Appearance: Normal appearance.   HENT:      Head: Normocephalic.      Right Ear: Tympanic membrane normal.      Left Ear: Tympanic membrane normal.      Nose: No congestion.       Right Sinus: Maxillary sinus tenderness present.      Left Sinus: Maxillary sinus tenderness present.      Mouth/Throat:      Pharynx: No posterior oropharyngeal erythema.   Eyes:      Extraocular Movements: Extraocular movements intact.      Pupils: Pupils are equal, round, and reactive to light.   Cardiovascular:      Rate and Rhythm: Normal rate and regular rhythm.      Pulses: Normal pulses.   Pulmonary:      Breath sounds: Normal breath sounds. No wheezing.   Musculoskeletal:         General: No swelling. Normal range of motion.      Cervical back: Normal range of motion and neck supple.   Skin:     General: Skin is warm and dry.   Neurological:      Mental Status: She is alert and oriented to person, place, and time.   Psychiatric:         Mood and Affect: Mood normal.         Behavior: Behavior normal.            ASSESSMENT AND PLAN:   1. Viral URI with cough  - CXR negative   - oxygen 98% in office on room air  - likely viral   - start tessalon pearls as needed for cough  - ok to take albuterol inhaler as needed for SOB    2. Sinus pressure  - add Flonase nasal spray daily       The patient indicates understanding of these issues and agrees to the plan.  Return for if symptoms do not resolve.       [1]   Allergies  Allergen Reactions    Junel 1-20 [Necon] PALPITATIONS     Heart racing, mood changes      Z-Good [Azithromycin] REACTIVE AIRWAY DISEASE

## 2025-01-14 NOTE — PATIENT INSTRUCTIONS
Take albuterol inhaler as needed for Shortness of breath    Use Flonase nasal spray daily for sinus pressure    Take tessalon pearls as needed for cough

## 2025-02-12 ENCOUNTER — PATIENT MESSAGE (OUTPATIENT)
Dept: INTERNAL MEDICINE CLINIC | Facility: CLINIC | Age: 20
End: 2025-02-12

## 2025-02-13 ENCOUNTER — TELEMEDICINE (OUTPATIENT)
Dept: INTERNAL MEDICINE CLINIC | Facility: CLINIC | Age: 20
End: 2025-02-13
Payer: COMMERCIAL

## 2025-02-13 DIAGNOSIS — A08.11 NOROVIRUS: Primary | ICD-10-CM

## 2025-02-13 PROCEDURE — 98004 SYNCH AUDIO-VIDEO EST SF 10: CPT | Performed by: INTERNAL MEDICINE

## 2025-02-13 NOTE — PROGRESS NOTES
Patient ID: Karen Oglesby is a 19 year old female.  No chief complaint on file.       Virtual/Telephone Check-In    Karen Oglesby verbally consents to a Virtual/Telephone Check-In service on 02/13/25. Patient understands and accepts financial responsibility for any deductible, co-insurance and/or co-pays associated with this service.  Patient call triage note reviewed.    HISTORY OF PRESENT ILLNESS:   Patient presents for above.  This is a telephone visit.    Patient has has norovirus since Monday. She needs a doctors note to return to work. 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 During the course of her illness, she was having muscle pains, joint pains, diarrhea, vomiting, headaches. Last bout of diarrhea was yesterday. She last vomited on 2/10/2025. Patient feels more weak and fatigued today. She has had no bowel movements. She is worried about syncope with temperature changes at work and being in and out the pool water as she felt presyncope after her warm shower yesterday.     MEDICAL HISTORY:     Past Medical History:    Autism (HCC)    Environmental allergies    Ovarian cyst       No past surgical history on file.      Current Outpatient Medications:     fluticasone propionate 50 MCG/ACT Nasal Suspension, 2 sprays by Nasal route daily., Disp: 16 g, Rfl: 0    acetaminophen 500 MG Oral Tab, Take 1 tablet (500 mg total) by mouth every 6 (six) hours as needed for Pain., Disp: , Rfl:     albuterol 108 (90 Base) MCG/ACT Inhalation Aero Soln, Inhale 1 puff into the lungs every 6 (six) hours as needed for Wheezing (cough)., Disp: 3 each, Rfl: 0    benzonatate 200 MG Oral Cap, Take 1 capsule (200 mg total) by mouth 3 (three) times daily as needed., Disp: 30 capsule, Rfl: 0    Allergies:Allergies[1]    Social History     Socioeconomic History    Marital status: Single     Spouse name: Not on file    Number of children: Not on file    Years of education: Not on file    Highest education  level: Not on file   Occupational History    Occupation: Student     Comment: studying education @ [employer].   Tobacco Use    Smoking status: Never     Passive exposure: Past    Smokeless tobacco: Never    Tobacco comments:     Father smokes outside   Vaping Use    Vaping status: Never Used   Substance and Sexual Activity    Alcohol use: Never     Alcohol/week: 0.0 standard drinks of alcohol    Drug use: Never    Sexual activity: Yes     Partners: Male     Birth control/protection: Condom   Other Topics Concern    Second-hand smoke exposure No    Alcohol/drug concerns No    Violence concerns No     Service Not Asked    Blood Transfusions No    Caffeine Concern Not Asked    Occupational Exposure Not Asked    Hobby Hazards Not Asked    Sleep Concern Not Asked    Stress Concern Not Asked    Weight Concern Not Asked    Special Diet Not Asked    Back Care Not Asked    Exercise Not Asked    Bike Helmet Not Asked    Seat Belt Not Asked    Self-Exams Not Asked   Social History Narrative    Live with patients, aunt, sibling, grandmother and dogs    No h/o abuse     Social Drivers of Health     Food Insecurity: Not on file   Transportation Needs: Not on file   Stress: Not on file   Housing Stability: Not on file       PHYSICAL EXAM:   Unable to perform vitals or do physical exam as this is a telephone visit.    ASSESSMENT/PLAN:   1. Norovirus. Continue supportive care, including hydrating with electrolyte-balanced solutions (eg Pedialyte), and advancing diet as tolerated. May return to work tomorrow, provided symptoms do not recur. Work note placed in chart.       No follow-ups on file.    Time spent on encounter  16 minutes   Telephone time 9 minutes   Documentation time 7 minutes     Karen Oglesby understands phone evaluation is not a substitute for face-to-face examination or emergency care. Patient advised to go to ER or call 911 for worsening symptoms or acute distress.    Please note that the following visit  was completed using two-way, real-time interactive audio and/or video communication.  This has been done in good nanette to provide continuity of care in the best interest of the provider-patient relationship, due to the ongoing public health crisis/national emergency and because of restrictions of visitation.  There are limitations of this visit as no physical exam could be performed.  Every conscious effort was taken to allow for sufficient and adequate time.  This billing was spent on reviewing labs, medications, radiology tests and decision making.  Appropriate medical decision-making and tests are ordered as detailed in the plan of care above.    This note was prepared using Dragon Medical voice recognition dictation software. As a result errors may occur. When identified these errors have been corrected. While every attempt is made to correct errors during dictation discrepancies may still exist.    Suzi Tompkins MD  2/13/2025       [1]   Allergies  Allergen Reactions    Junel 1-20 [Necon] PALPITATIONS     Heart racing, mood changes      Z-Good [Azithromycin] REACTIVE AIRWAY DISEASE

## 2025-02-25 ENCOUNTER — TELEMEDICINE (OUTPATIENT)
Dept: INTERNAL MEDICINE CLINIC | Facility: CLINIC | Age: 20
End: 2025-02-25
Payer: COMMERCIAL

## 2025-02-25 DIAGNOSIS — A08.11 NOROVIRUS: Primary | ICD-10-CM

## 2025-02-25 PROCEDURE — 98004 SYNCH AUDIO-VIDEO EST SF 10: CPT | Performed by: INTERNAL MEDICINE

## 2025-02-25 NOTE — PROGRESS NOTES
Patient ID: Karen Oglesby is a 19 year old female.  No chief complaint on file.         HISTORY OF PRESENT ILLNESS:   Patient presents for above.  This visit is conducted using Telemedicine with live, interactive video and audio.  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       She is completely back to normal and would like to return to work without restrictions and would need another letter        Review of Systems   Ten point review of systems otherwise negative with the exception of HPI and assessment and plan.    MEDICAL HISTORY:     Past Medical History:    Autism (HCC)    Environmental allergies    Ovarian cyst       No past surgical history on file.      Current Outpatient Medications:     fluticasone propionate 50 MCG/ACT Nasal Suspension, 2 sprays by Nasal route daily., Disp: 16 g, Rfl: 0    acetaminophen 500 MG Oral Tab, Take 1 tablet (500 mg total) by mouth every 6 (six) hours as needed for Pain., Disp: , Rfl:     albuterol 108 (90 Base) MCG/ACT Inhalation Aero Soln, Inhale 1 puff into the lungs every 6 (six) hours as needed for Wheezing (cough)., Disp: 3 each, Rfl: 0    benzonatate 200 MG Oral Cap, Take 1 capsule (200 mg total) by mouth 3 (three) times daily as needed., Disp: 30 capsule, Rfl: 0    Allergies:Allergies[1]    Social History     Socioeconomic History    Marital status: Single     Spouse name: Not on file    Number of children: Not on file    Years of education: Not on file    Highest education level: Not on file   Occupational History     Occupation: Student     Comment: studying education @ [employer].   Tobacco Use    Smoking status: Never     Passive exposure: Past    Smokeless tobacco: Never    Tobacco comments:     Father smokes outside   Vaping Use    Vaping status: Never Used   Substance and Sexual Activity    Alcohol use: Never     Alcohol/week: 0.0 standard drinks of alcohol    Drug use: Never    Sexual activity: Yes     Partners: Male     Birth control/protection: Condom   Other Topics Concern    Second-hand smoke exposure No    Alcohol/drug concerns No    Violence concerns No     Service Not Asked    Blood Transfusions No    Caffeine Concern Not Asked    Occupational Exposure Not Asked    Hobby Hazards Not Asked    Sleep Concern Not Asked    Stress Concern Not Asked    Weight Concern Not Asked    Special Diet Not Asked    Back Care Not Asked    Exercise Not Asked    Bike Helmet Not Asked    Seat Belt Not Asked    Self-Exams Not Asked   Social History Narrative    Live with patients, aunt, sibling, grandmother and dogs    No h/o abuse     Social Drivers of Health     Food Insecurity: Not on file   Transportation Needs: Not on file   Stress: Not on file   Housing Stability: Not on file       PHYSICAL EXAM:   Unable to perform vitals or do physical exam as this is a virtual video visit.  Patient appears alert And oriented x 3, no acute distress  Patient speaking complete sentences on any conversational dyspnea or respiratory distress  No coughing heard    ASSESSMENT/PLAN:   1. Norovirus  Symptoms completely resolved and now ready to go back to work-letter provided for patient to return to work without restrictions    No follow-ups on file.    Time spent on encounter  10minutes   Video time 10 minutes   Documentation time 10 minutes     Karen Oglesby understands video evaluation is not a substitute for face-to-face examination or emergency care. Patient advised to go to ER or call 911 for worsening symptoms or acute distress.      Telehealth outside of [facility]  Telehealth Verbal Consent   I conducted a telehealth visit with Karen Oglesby today, 02/25/25, which was completed using two-way, real-time interactive audio and video communication. This has been done in good nanette to provide continuity of care in the best interest of the provider-patient relationship, due to the COVID -19 public health crisis/national emergency where restrictions of face-to-face office visits are ongoing. Every conscious effort was taken to allow for sufficient and adequate time to complete the visit.  The patient was made aware of the limitations of the telehealth visit, including treatment limitations as no physical exam could be performed.  The patient was advised to call 911 or to go to the ER in case there was an emergency.  The patient was also advised of the potential privacy & security concerns related to the telehealth platform.   The patient was made aware of where to find [facility]'s notice of privacy practices, telehealth consent form and other related consent forms and documents.  which are located on the [facility] website. The patient verbally agreed to telehealth consent form, related consents and the risks discussed.    Lastly, the patient confirmed that they were in [place].   Included in this visit, time may have been spent reviewing labs, medications, radiology tests and decision making. Appropriate medical decision-making and tests are ordered as detailed in the plan of care above.  Coding/billing information is submitted for this visit based on complexity of care and/or time spent for the visit.    This note was prepared using Dragon Medical voice recognition dictation software. As a result errors may occur. When identified these errors have been corrected. While every attempt is made to correct errors during dictation discrepancies may still exist.    Cheryl Schaefer MD  2/25/2025         [1]   Allergies  Allergen Reactions    Junel 1-20 [Necon]  PALPITATIONS     Heart racing, mood changes      Z-Good [Azithromycin] REACTIVE AIRWAY DISEASE

## 2025-06-17 ENCOUNTER — LAB ENCOUNTER (OUTPATIENT)
Dept: LAB | Age: 20
End: 2025-06-17
Attending: INTERNAL MEDICINE
Payer: COMMERCIAL

## 2025-06-17 ENCOUNTER — OFFICE VISIT (OUTPATIENT)
Dept: INTERNAL MEDICINE CLINIC | Facility: CLINIC | Age: 20
End: 2025-06-17
Payer: COMMERCIAL

## 2025-06-17 VITALS
HEART RATE: 83 BPM | OXYGEN SATURATION: 100 % | BODY MASS INDEX: 30.71 KG/M2 | HEIGHT: 61 IN | RESPIRATION RATE: 20 BRPM | SYSTOLIC BLOOD PRESSURE: 126 MMHG | TEMPERATURE: 98 F | DIASTOLIC BLOOD PRESSURE: 74 MMHG | WEIGHT: 162.63 LBS

## 2025-06-17 DIAGNOSIS — Z32.01 POSITIVE PREGNANCY TEST (HCC): ICD-10-CM

## 2025-06-17 DIAGNOSIS — N64.3 GALACTORRHEA: Primary | ICD-10-CM

## 2025-06-17 DIAGNOSIS — R10.2 SUPRAPUBIC PAIN: ICD-10-CM

## 2025-06-17 DIAGNOSIS — N64.3 GALACTORRHEA: ICD-10-CM

## 2025-06-17 LAB
ALBUMIN SERPL-MCNC: 5.1 G/DL (ref 3.2–4.8)
ALBUMIN/GLOB SERPL: 1.8 {RATIO} (ref 1–2)
ALP LIVER SERPL-CCNC: 133 U/L (ref 52–144)
ALT SERPL-CCNC: 11 U/L (ref 10–49)
ANION GAP SERPL CALC-SCNC: 11 MMOL/L (ref 0–18)
AST SERPL-CCNC: 20 U/L (ref ?–34)
B-HCG SERPL-ACNC: <2.6 MIU/ML (ref ?–4.2)
BASOPHILS # BLD AUTO: 0.05 X10(3) UL (ref 0–0.2)
BASOPHILS NFR BLD AUTO: 0.6 %
BILIRUB SERPL-MCNC: 0.4 MG/DL (ref 0.3–1.2)
BILIRUB UR QL: NEGATIVE
BUN BLD-MCNC: 6 MG/DL (ref 9–23)
BUN/CREAT SERPL: 7.9 (ref 10–20)
CALCIUM BLD-MCNC: 9.6 MG/DL (ref 8.7–10.4)
CHLORIDE SERPL-SCNC: 101 MMOL/L (ref 98–112)
CO2 SERPL-SCNC: 25 MMOL/L (ref 21–32)
CREAT BLD-MCNC: 0.76 MG/DL (ref 0.55–1.02)
DEPRECATED RDW RBC AUTO: 41.4 FL (ref 35.1–46.3)
EGFRCR SERPLBLD CKD-EPI 2021: 114 ML/MIN/1.73M2 (ref 60–?)
EOSINOPHIL # BLD AUTO: 0.07 X10(3) UL (ref 0–0.7)
EOSINOPHIL NFR BLD AUTO: 0.8 %
ERYTHROCYTE [DISTWIDTH] IN BLOOD BY AUTOMATED COUNT: 12.5 % (ref 11–15)
FASTING STATUS PATIENT QL REPORTED: YES
FSH SERPL-ACNC: 9.9 MIU/ML
GLOBULIN PLAS-MCNC: 2.9 G/DL (ref 2–3.5)
GLUCOSE BLD-MCNC: 86 MG/DL (ref 70–99)
GLUCOSE UR-MCNC: NORMAL MG/DL
HCT VFR BLD AUTO: 42.5 % (ref 35–48)
HGB BLD-MCNC: 13.4 G/DL (ref 12–16)
HGB UR QL STRIP.AUTO: NEGATIVE
IMM GRANULOCYTES # BLD AUTO: 0.02 X10(3) UL (ref 0–1)
IMM GRANULOCYTES NFR BLD: 0.2 %
KETONES UR-MCNC: NEGATIVE MG/DL
LEUKOCYTE ESTERASE UR QL STRIP.AUTO: NEGATIVE
LH SERPL-ACNC: 19 MIU/ML
LYMPHOCYTES # BLD AUTO: 1.48 X10(3) UL (ref 1–4)
LYMPHOCYTES NFR BLD AUTO: 17.9 %
MCH RBC QN AUTO: 28 PG (ref 26–34)
MCHC RBC AUTO-ENTMCNC: 31.5 G/DL (ref 31–37)
MCV RBC AUTO: 88.7 FL (ref 80–100)
MONOCYTES # BLD AUTO: 0.38 X10(3) UL (ref 0.1–1)
MONOCYTES NFR BLD AUTO: 4.6 %
NEUTROPHILS # BLD AUTO: 6.27 X10 (3) UL (ref 1.5–7.7)
NEUTROPHILS # BLD AUTO: 6.27 X10(3) UL (ref 1.5–7.7)
NEUTROPHILS NFR BLD AUTO: 75.9 %
NITRITE UR QL STRIP.AUTO: NEGATIVE
OSMOLALITY SERPL CALC.SUM OF ELEC: 281 MOSM/KG (ref 275–295)
PH UR: 6.5 [PH] (ref 5–8)
PLATELET # BLD AUTO: 312 10(3)UL (ref 150–450)
POTASSIUM SERPL-SCNC: 3.7 MMOL/L (ref 3.5–5.1)
PROLACTIN SERPL-MCNC: 7.7 NG/ML
PROT SERPL-MCNC: 8 G/DL (ref 5.7–8.2)
PROT UR-MCNC: NEGATIVE MG/DL
RBC # BLD AUTO: 4.79 X10(6)UL (ref 3.8–5.3)
SODIUM SERPL-SCNC: 137 MMOL/L (ref 136–145)
SP GR UR STRIP: 1.02 (ref 1–1.03)
T4 FREE SERPL-MCNC: 1 NG/DL (ref 0.8–1.7)
TSI SER-ACNC: 3.68 UIU/ML (ref 0.55–4.78)
UROBILINOGEN UR STRIP-ACNC: NORMAL
WBC # BLD AUTO: 8.3 X10(3) UL (ref 4–11)

## 2025-06-17 PROCEDURE — 83002 ASSAY OF GONADOTROPIN (LH): CPT

## 2025-06-17 PROCEDURE — 84146 ASSAY OF PROLACTIN: CPT

## 2025-06-17 PROCEDURE — 84702 CHORIONIC GONADOTROPIN TEST: CPT

## 2025-06-17 PROCEDURE — 84439 ASSAY OF FREE THYROXINE: CPT

## 2025-06-17 PROCEDURE — 99214 OFFICE O/P EST MOD 30 MIN: CPT | Performed by: INTERNAL MEDICINE

## 2025-06-17 PROCEDURE — 36415 COLL VENOUS BLD VENIPUNCTURE: CPT

## 2025-06-17 PROCEDURE — 84443 ASSAY THYROID STIM HORMONE: CPT

## 2025-06-17 PROCEDURE — 84305 ASSAY OF SOMATOMEDIN: CPT

## 2025-06-17 PROCEDURE — 85025 COMPLETE CBC W/AUTO DIFF WBC: CPT

## 2025-06-17 PROCEDURE — 83001 ASSAY OF GONADOTROPIN (FSH): CPT

## 2025-06-17 PROCEDURE — 80053 COMPREHEN METABOLIC PANEL: CPT

## 2025-06-17 PROCEDURE — 81001 URINALYSIS AUTO W/SCOPE: CPT

## 2025-06-17 NOTE — PROGRESS NOTES
Karen Oglesby is a 20 year old female with complaints of:  Chief Complaint: Lab (Patient is requesting blood work for possible pregnancy, patient states she has been taking at home test that have shown a faint positive line. Patient states false positives have been due to thyroid issue.)    HPI     Karen Oglesby is a(n) 20 year old female with a history of autism, environmental allergy, who presents for follow up.    History of Present Illness  Karen Oglesby is a 20 year old female who presents with pregnancy symptoms and galactorrhea.    She experiences symptoms suggestive of pregnancy, including mood swings, extreme fatigue, and nocturnal nausea. She also notes episodes of galactorrhea, specifically from the right breast, described as 'leaking white stuff' after showering. This has occurred a few times recently, although not consistently. She was pregnant last July but had eight false positive tests and was informed she was not pregnant. She is concerned about a recurrence of similar symptoms.    She has a history of thyroid issues and mentions having consistent menstrual cycles for the past few months, with no current menstrual dysfunction. However, prior to April, she experienced several months without a period. She is not on birth control due to an allergy and is uncertain about her ovulation and menstrual cycle timing.    She reports occasional dizziness, which she attributes to possible dehydration, and has experienced discomfort in her kidneys, leading to increased water intake. She describes a recent sensation of 'lower belly stretching' that was painful, preventing her from lying on her stomach. This discomfort has lessened but remains confusing. She has a history of ovarian cysts noted in previous medical appointments.    No vision issues, palpitations, or significant lightheadedness beyond the occasional dizziness.        Past Medical History     Past Medical History[1]     Past Surgical  History     Past Surgical History[2]     Family History     Family History[3]    Social History     Short Social Hx on File[4]    Allergies     Allergies[5]    Current Medications     Current Outpatient Medications   Medication Sig Dispense Refill    fluticasone propionate 50 MCG/ACT Nasal Suspension 2 sprays by Nasal route daily. 16 g 0    albuterol 108 (90 Base) MCG/ACT Inhalation Aero Soln Inhale 1 puff into the lungs every 6 (six) hours as needed for Wheezing (cough). 3 each 0    benzonatate 200 MG Oral Cap Take 1 capsule (200 mg total) by mouth 3 (three) times daily as needed. 30 capsule 0     No current facility-administered medications for this visit.       Review of Systems     GENERAL HEALTH: feels well otherwise  SKIN: denies any unusual skin lesions or rashes  RESPIRATORY: denies shortness of breath with exertion  CARDIOVASCULAR: denies chest pain on exertion  GI: denies abdominal pain and denies heartburn  : denies any burning with urination, urinary frequency or urgency  NEURO: denies headaches, numbness or tingling, mental status changes  PSYCH: denies depressed mood, anxiety  MUSC: denies muscle aches, joint pain    Physical Exam     /74 (BP Location: Left arm, Patient Position: Sitting, Cuff Size: adult)   Pulse 83   Temp 98 °F (36.7 °C) (Temporal)   Resp 20   Ht 5' 1\" (1.549 m)   Wt 162 lb 9.6 oz (73.8 kg)   LMP 05/17/2025 (Exact Date)   SpO2 100%   Breastfeeding No   BMI 30.72 kg/m²     GENERAL: well developed, well nourished,in no apparent distress  SKIN: no rashes,no suspicious lesions  HEENT: atraumatic, normocephalic,ears and throat are clear  NECK: supple,no adenopathy,no bruits  LUNGS: clear to auscultation  CARDIO: RRR without murmur  GI: good BS's,no masses, HSM or tenderness  EXTREMITIES: no cyanosis, clubbing or edema    Assessment and Plan     Assessment & Plan  Galactorrhea  Positive pregnancy test (HCC)  Intermittent white discharge from the right nipple, with a  history of hypothyroidism and positive pregnancy tests suggests a possible pituitary disorder. Differential diagnosis includes hyperprolactinemia or other pituitary hormone imbalances. A thorough evaluation of pituitary function is warranted.  - Order blood tests to evaluate pituitary hormones: TSH, free T4, prolactin, TSH, FSH, ACTH  - Order HCG test to rule out pregnancy.  - Plan MRI of the head if hormonal evaluation suggests a pituitary problem.  - Refer to endocrinologist if pituitary issue is suspected.  Orders:    CBC With Differential With Platelet; Future    Comp Metabolic Panel (14); Future    TSH and Free T4 [E]; Future    Prolactin [E]; Future    IGF-1 [E]; Future    ACTH, Plasma [E]; Future    LH (Luteinizing Hormone) [E]; Future    FSH [E]; Future    HCG, Beta Subunit, Quant [E]; Future    OBG Referral - In Network    Suprapubic pain  Lower abdominal discomfort described as a stretching sensation, with a history of ovarian cysts as a potential contributing factor. Differential diagnosis includes ovarian cysts or other gynecological issues. Transvaginal/transabdominal ultrasound is deferred until gynecologist evaluation.  - Order urine test to rule out urinary tract infection or other urinary issues.  - Refer to gynecologist for further evaluation and management.  - Consider ultrasound if recommended by gynecologist.  Orders:    CBC With Differential With Platelet; Future    Comp Metabolic Panel (14); Future    Urinalysis with Culture Reflex [E]; Future    OBG Referral - In Network             Current Medications[6]    Requested Prescriptions      No prescriptions requested or ordered in this encounter       No orders of the defined types were placed in this encounter.      No follow-ups on file.    The patient indicates understanding of these issues and agrees to the plan.    Electronically signed by Suzi Tompkins MD 06/17/25             [1]   Past Medical History:   Autism (HCC)    Environmental  allergies    Ovarian cyst   [2] History reviewed. No pertinent surgical history.  [3]   Family History  Problem Relation Age of Onset    Hypertension Mother     Heart Disorder Mother 42         MI - s/p 3 stents     Lipids Mother     Diabetes Maternal Grandfather     Heart Disorder Maternal Grandfather     Hypertension Maternal Grandfather     Lipids Other         HYPERLIPIDEMIA, FAMILY H/O    Heart Disease Other         CORONARY ARTERY DISEASE, FAMILY H/O    Asthma Neg     Cancer Neg    [4]   Social History  Socioeconomic History    Marital status: Single   Occupational History    Occupation: Student     Comment: studying education @ [employer].   Tobacco Use    Smoking status: Never     Passive exposure: Past    Smokeless tobacco: Never    Tobacco comments:     Father smokes outside   Vaping Use    Vaping status: Never Used   Substance and Sexual Activity    Alcohol use: Never     Alcohol/week: 0.0 standard drinks of alcohol    Drug use: Never    Sexual activity: Yes     Partners: Male     Birth control/protection: Condom   Other Topics Concern    Second-hand smoke exposure No    Alcohol/drug concerns No    Violence concerns No    Blood Transfusions No   Social History Narrative    Live with patients, aunt, sibling, grandmother and dogs    No h/o abuse     Social Drivers of Health     Food Insecurity: No Food Insecurity (6/17/2025)    NCSS - Food Insecurity     Worried About Running Out of Food in the Last Year: No     Ran Out of Food in the Last Year: No   Transportation Needs: No Transportation Needs (6/17/2025)    NCSS - Transportation     Lack of Transportation: No   Housing Stability: Not At Risk (6/17/2025)    NCSS - Housing/Utilities     Has Housing: Yes     Worried About Losing Housing: No     Unable to Get Utilities: No   [5]   Allergies  Allergen Reactions    Junel 1-20 [Necon] PALPITATIONS     Heart racing, mood changes      Z-Good [Azithromycin] REACTIVE AIRWAY DISEASE   [6]   No outpatient medications have  been marked as taking for the 6/17/25 encounter (Office Visit) with Suzi Tompkins MD.      None

## 2025-06-17 NOTE — PROGRESS NOTES
The following individual(s) verbally consented to be recorded using ambient AI listening technology and understand that they can each withdraw their consent to this listening technology at any point by asking the clinician to turn off or pause the recording:     Patient name: Karen Oglesby  Additional names:

## 2025-06-20 LAB
IGF I: 238 NG/ML
IGF-1, Z SCORE: 0.4 S.D.

## 2025-06-23 ENCOUNTER — MED REC SCAN ONLY (OUTPATIENT)
Dept: INTERNAL MEDICINE CLINIC | Facility: CLINIC | Age: 20
End: 2025-06-23

## 2025-08-28 ENCOUNTER — MED REC SCAN ONLY (OUTPATIENT)
Dept: INTERNAL MEDICINE CLINIC | Facility: CLINIC | Age: 20
End: 2025-08-28

## (undated) NOTE — LETTER
10/12/2024          To Whom It May Concern:    Karen Oglesby is currently under my medical care and was seen and evaluated today..  She may return to work on Monday, October 14, 2024.    If you require additional information please contact our office.        Sincerely,    Cheryl Schaefer MD          Document generated by:  Cheryl Schaefer MD

## (undated) NOTE — ED AVS SNAPSHOT
Parent/Legal Guardian Access to the Online INTEX Program Record of a Patient 15to 16Years Old  Return completed form by Secure email to [place] [facility]/Medical Records Department: [email or web address]     Requirements and Procedures   Under [facility] MyChart ID and password with another person, that person may be able to view my or my child’s health information, and health information about someone who has authorized me as a MyChart proxy.    ·  I agree that it is my responsibility to select a confident Sign-Up Form and I agree to its terms.        Authorization Form     Please enter Patient’s information below:   Name (last, first, middle initial) __________________________________________   Gender  Male  Female    Last 4 Digits of Social Security Number Parent/Legal Guardian Signature                                  For Patient (1517 years of age)  I agree to allow my parent/legal guardian, named above, online access to my medical information currently available and that may become available as a result

## (undated) NOTE — LETTER
Name:  Iris Ramirez Year:  10th Grade Class: Student ID No.:   Address:  [address] [place] [address] Phone:  [phone] (home)  :  11year old   Name Relationship [address] Work Phone Home Phone Mobile Phone   1.  Be Gabriel implanted defibrillator? 12. Has anyone in your family had unexplained fainting, seizures, or near drowning?      BONE AND JOINT QUESTIONS Yes No   17. Have you ever had an injury to a bone, muscle, ligament, or tendon that caused you to miss a practice 39.Have you ever been unable to move your arms / legs after being hit /fall? 40. Have you ever become ill while exercising in the heat?     41. Do you get frequent muscle cramps when exercising? 42.  Do you or someone in your family have sickle cell · Location of point of maximal impulse (PMI) Yes    Pulses Yes    Lungs Yes    Abdomen Yes    Genitourinary (males only)* N/A    Skin:  HSV, lesions suggestive of MRSA, tinea corporis Yes    Neurologic* Yes    MUSCULOSKELETAL     Neck Yes    Back Yes    Sh performance-enhancing substances in my/his/her body either during [employer] state series events or during the school day, and I/our student do/does hereby agree to submit to such testing and analysis by a certified laboratory.  We further understand and agree th

## (undated) NOTE — LETTER
[facility] [employer] of Child Health Examination       Student's Name  Victory Kayser Birth Da Title                           Date   7/3/2022   Signature                                                                                                                                              Title HEALTH HISTORY          TO BE COMPLETED AND SIGNED BY PARENT/GUARDIAN AND VERIFIED BY HEALTH CARE PROVIDER    ALLERGIES  (Food, drug, insect, other)  Z-Good [Azithromycin] MEDICATION  (List all prescribed or taken on a regular basis.)    Current Outpatient PHYSICAL EXAMINATION REQUIREMENTS    Entire section below to be completed by MD/DO/APN/PA       PHYSICAL EXAMINATION REQUIREMENTS (head circumference if <33 years old):   /78   Pulse 69   Ht 5' 0.5\" (1.537 m)   Wt 59.4 kg (131 lb)   BMI 25.16 kg/m² Mouth/Dental Yes  Spinal examination Yes    Cardiovascular/HTN Yes  Nutritional status Yes    Respiratory Yes                   Diagnosis of Asthma: No Mental Health Yes        Currently Prescribed Asthma Medication:            Quick-relief  medication (e.

## (undated) NOTE — LETTER
Date & Time: 9/30/2024, 5:31 PM  Patient: Karen Oglesby  Encounter Provider(s):    Carson Marie MD       To Whom It May Concern:    Karen Oglesby was seen and treated in our department on 9/30/2024. Please allow to participate in remote class work for this week..    If you have any questions or concerns, please do not hesitate to call.        _____________________________  Physician/APC Signature

## (undated) NOTE — LETTER
2/13/2025          To Whom It May Concern:    Karen Oglesby is currently under my medical care and may return to work at this time.      Activity is restricted as follows: avoid large temperature changes, avoid being submerged in the pool water, and avoid strenuous activity (eg CPR training) for 2 weeks from the day she returns to work. After 2 weeks, provided her symptoms have not recurred and clinically she is improved, she may resume her normal activities.     If you require additional information please contact our office.        Sincerely,    Suzi Tompkins MD            Document generated by:  Suzi Tompkins MD

## (undated) NOTE — LETTER
11/14/2019              Pablo Espinosa        [address]         To Whom it may concern:     This is to certify that Pablo Espinosa had an appointment on 11/14/2019 with Gian Colunga MD.  Please excuse any recent absen

## (undated) NOTE — LETTER
11/22/2024              Karen Oglesby        [address] [facility] [address]         Dear /,    Karen Oglesby was seen in the office 11/22/24. She has been acutely ill, and has been undergoing appropriate treatment and evaluation for this. Please excuse her absence on 11/21/2024.     Sincerely,    Suzi Tompkins MD            Document electronically generated by:  Suzi Tompkins MD

## (undated) NOTE — LETTER
[facility] [employer] of Child Health Examination       Student's Name  Sekou Zuñiga Da Title                           Date  07/06/2019   Signature HEALTH HISTORY          TO BE COMPLETED AND SIGNED BY PARENT/GUARDIAN AND VERIFIED BY HEALTH CARE PROVIDER    ALLERGIES  (Food, drug, insect, other)  Patient has no known allergies.  MEDICATION  (List all prescribed or taken on a regular basis.)     Diagnos /78 (BP Location: Right arm, Patient Position: Sitting, Cuff Size: adult)   Pulse 61   Ht 5' 1\" (1.549 m)   Wt 58.1 kg (128 lb)   BMI 24.19 kg/m²     DIABETES SCREENING  BMI>85% age/sex  No And any two of the following:  Family History Yes    Ethnic Respiratory Yes                   Diagnosis of Asthma: No Mental Health Yes        Currently Prescribed Asthma Medication:            Quick-relief  medication (e.g. Short Acting Beta Antagonist): No          Controller medication (e.g. inhaled corticostero

## (undated) NOTE — LETTER
Date & Time: 5/25/2024, 3:34 PM  Patient: Karen Oglesby  Encounter Provider(s):    Archana Menard APRN       To Whom It May Concern:    Karen Oglesby was seen and treated in our department on 5/25/2024. She should not return to work until 05/27/2024 .    If you have any questions or concerns, please do not hesitate to call.        _____________________________  Physician/APC Signature

## (undated) NOTE — Clinical Note
State of [facility] [place] [place] of Child Health Examination       Student's Name  [place] Birth Ravi Title                           Date    (If adding dates to the above immunization history section, put your initials by date(s) and sign here.)   ALTERNATIVE PROOF OF IMMUNITY   1 Diagnosis of asthma? Child wakes during the night coughing   Yes   No    Yes   No    Loss of function of one of paired organs? (eye/ear/kidney/testicle)   Yes   No      Birth Defects? Developmental delay? Yes   No    Yes   No  Hospitalizations? When? polycystic ovarian syndrome, acanthosis nigricans)              no             At Risk           no   Lead Risk Questionnaire  Req'd for children 6 months thru 6 yrs enrolled in licensed or public school operated day care, ,  nursery school and/or SPECIAL INSTRUCTIONS/DEVICES e.g. safety glasses, glass eye, chest protector for arrhythmia, pacemaker, prosthetic device, dental bridge, false teeth, athleticsupport/cup     None   MENTAL HEALTH/OTHER   Is there anything else the school should know about

## (undated) NOTE — LETTER
4/15/2021              Paticia Ganser        [address] [facility]         To Whom It May Concern,    Please be advised Jacquelin was seen in my office on 4/14/2021.  Due to a close exposure to COVID on 4/9/2021, please excuse he

## (undated) NOTE — LETTER
[facility] of Child Health Examination       Student's Name  Dilan Hope Birth Ravi Title                           Date     Signature HEALTH HISTORY          TO BE COMPLETED AND SIGNED BY PARENT/GUARDIAN AND VERIFIED BY HEALTH CARE PROVIDER    ALLERGIES  (Food, drug, insect, other)  Patient has no known allergies.  MEDICATION  (List all prescribed or taken on a regular basis.)  No current /75   Ht 5' 1.1\" (1.552 m)   Wt 56.9 kg (125 lb 8 oz)   BMI 23.64 kg/m²     DIABETES SCREENING  BMI>85% age/sex  No And any two of the following:  Family History No    Ethnic Minority  No          Signs of Insulin Resistance (hypertension, dyslipide Currently Prescribed Asthma Medication:            Quick-relief  medication (e.g. Short Acting Beta Antagonist): No          Controller medication (e.g. inhaled corticosteroid):   No Other   NEEDS/MODIFICATIONS required in the school setting  None DIET

## (undated) NOTE — LETTER
10/22/2024          To Whom It May Concern:    Karen Oglesby is currently under my medical care.  Patient states she was unable to attend school from October to December 2021 due to covid symptoms. This is very much possible since it is documented in the chart that  other family members tested positive at that time.  If you require additional information please contact our office.        Sincerely,    Cheryl Schaefer MD          Document generated by:  Cheryl Schaefer MD

## (undated) NOTE — LETTER
2/25/2025          To Whom It May Concern:    Karen Oglesby is currently under my medical care and may not return to work at this time.    She may return to work on 3/4/2025.  Activity is restricted as follows: none.    If you require additional information please contact our office.        Sincerely,    Cheryl Schaefer MD          Document generated by:  Cheryl Schaefer MD

## (undated) NOTE — LETTER
Name:  Gene Merline Year:  11th Grade Class: Student ID No.:   Address:  [address] [address] Phone:  [phone] (home)  : [age]year old   Name Relationship [address] Work Phone Home Phone Mobile Phone   1.  Berna Doran 12. Has anyone in your family had unexplained fainting, seizures, or near drowning? BONE AND JOINT QUESTIONS Yes No   17. Have you ever had an injury to a bone, muscle, ligament, or tendon that caused you to miss a practice or a game?      18. Have you /fall?     36. Have you ever become ill while exercising in the heat?     41. Do you get frequent muscle cramps when exercising? 42. Do you or someone in your family have sickle cell trait or disease? 43.  Have you ever had any problems with your ey · Location of point of maximal impulse (PMI) Yes    Pulses Yes    Lungs Yes    Abdomen Yes    Genitourinary (males only)* N/A    Skin:  HSV, lesions suggestive of MRSA, tinea corporis Yes    Neurologic* Yes    MUSCULOSKELETAL     Neck Yes    Back Yes    Sh performance-enhancing substances in my/his/her body either during [employer] state [employer] events or during the school day, and I/our student do/does hereby agree to submit to such testing and analysis by a certified laboratory.  We further understand and agree th

## (undated) NOTE — LETTER
November 25, 2024     Karen Oglesby  [address] [facility] [address]      Dear Karen:    Below are the results from your recent visit:    Resulted Orders   SARS-CoV-2/Flu A and B/RSV by PCR ([facility]) [E] *Collect in Office!   Result Value Ref Range    SARS-CoV-2 (COVID-19)- (Alinity) Not Detected Not Detected    Influenza A by PCR Not Detected Not Detected    Influenza B by PCR Not Detected Not Detected    RSV by PCR Not Detected Not Detected    Narrative    This test is intended for the simultaneous qualitative detection and differentiation of RNA from SARS-CoV-2, influenza A virus (flu A), influenza B virus (flu B), and/or Respiratory Syncytial Virus (RSV) using nares swabs or nasopharyngeal swabs collected from individuals suspected of a respiratory viral infection consistent with COVID-19.     A \"Detected\" result is considered a positive test result for influenza A, influenza B, RSV and/or COVID-19. Positive results are indicative of active infection but do not rule out bacterial infection or co-infection with other pathogens not detected by the test.    A \"Not Detected\" result for this test means that influenza A, influenza B, RSV or SARS-CoV-2 RNA was not present in the sample above the limit of detection of the assay.    An \"Inconclusive\" result for this test means that the presence or absence of influenza A, influenza B, RSV or COVID-19 viral RNA cannot be determined, and recollection and testing by a different method should be considered.     Test performed using the Abbott Alinity m Resp-4-Plex real-time reverse transcriptase (RT) polymerase chain reaction (PCR), (RT-PCR) assay on the Cloudnex[employer] m instrument, [employer] [place] [address]     This test is being used under the Food and Drug Administration's Emergency Use Authorization.     The authorized Fact Sheet for Healthcare Providers for this assay is available upon request from the laboratory.       COVID, flu, RSV all neg.      Suzi Tompkins MD

## (undated) NOTE — MR AVS SNAPSHOT
[address], [address]  [address] [facility]  [phone]               Thank you for choosing us for your health care visit with Ansatasia Ford MD.  We are glad to serve you and happy to provide you with this summa healthy? Make sure to talk to your teen about who his or her friends are and how they spend time together. Peer pressure can be a problem among teenagers. · Life at home. How is your child’s behavior? Does he or she get along with others in the family?  Is how to spend free time. You can’t always have the final say, but you can encourage healthy habits. Your teen should:  · Get at least 30 minutes to 60 minutes of physical activity every day. This time can be broken up throughout the day.  After-school sports · Let the health care provider know if you or your teen have questions about hygiene or acne. · Bring your teen to the dentist at least twice a year for teeth cleaning and a checkup. · Remind your teen to brush and floss his or her teeth before bed.   Sle driving. Teach your teen to always wear a seat belt, drive the speed limit, and follow the rules of the road. Do not allow your teenager to text or talk on a cell phone while driving. (And don’t do this yourself!  Remember, you set an example.)  · Set rules eased. Offer your love and support.  If your teen talks about death or suicide, seek help right away.      Next checkup at: _______________________________     PARENT NOTES:  Date Last Reviewed: 10/2/2014  © 1946-5338 [employer] [facility] [address] [place]             05/20/2004 07/19/2013                                  Tylenol/Acetaminophen Dosing    Please dose every 4 hours as needed,do not give more than 4 doses in any 24 hour period  Dosing should be done on a dose/weight basis  Children' the same amount as Children's acetaminophen (it eliminates confusion)  Do not give ibuprofen to children under 10months of age unless advised by your doctor    Infant Concentrated drops = 50 mg/1.25ml  Children's suspension =100 mg/5 ml  Children's chewabl certain ages. It is perfectly natural for a teen to reach some milestones earlier and others later than the general trend. The following are general guidelines for the stages of normal development.   Physical Development   May have growth spurt (girls usual Current Medications      Notice  As of 6/23/2017 11:39 AM    You have not been prescribed any medications. [email or web address]     Sign up for [employer] access for your child.   [employer] access allows you to view health information for your child from their o go on a walking scavenger hunt through the neighborhood   o grow a family garden    In addition to 11, 4, 3, 2, 1 families can make small changes in their family routines to help everyone lead healthier active lives.  Try:  o Eating breakfast everyday  o E